# Patient Record
Sex: FEMALE | Race: BLACK OR AFRICAN AMERICAN | NOT HISPANIC OR LATINO | Employment: UNEMPLOYED | URBAN - METROPOLITAN AREA
[De-identification: names, ages, dates, MRNs, and addresses within clinical notes are randomized per-mention and may not be internally consistent; named-entity substitution may affect disease eponyms.]

---

## 2018-02-27 ENCOUNTER — OFFICE VISIT CONVERTED (OUTPATIENT)
Dept: FAMILY MEDICINE CLINIC | Facility: CLINIC | Age: 43
End: 2018-02-27
Attending: NURSE PRACTITIONER

## 2018-03-13 ENCOUNTER — OFFICE VISIT CONVERTED (OUTPATIENT)
Dept: PODIATRY | Facility: CLINIC | Age: 43
End: 2018-03-13
Attending: PODIATRIST

## 2018-04-04 ENCOUNTER — OFFICE VISIT CONVERTED (OUTPATIENT)
Dept: PODIATRY | Facility: CLINIC | Age: 43
End: 2018-04-04
Attending: PODIATRIST

## 2018-06-07 ENCOUNTER — CONVERSION ENCOUNTER (OUTPATIENT)
Dept: FAMILY MEDICINE CLINIC | Facility: CLINIC | Age: 43
End: 2018-06-07

## 2018-06-07 ENCOUNTER — OFFICE VISIT CONVERTED (OUTPATIENT)
Dept: FAMILY MEDICINE CLINIC | Facility: CLINIC | Age: 43
End: 2018-06-07
Attending: NURSE PRACTITIONER

## 2018-06-26 ENCOUNTER — OFFICE VISIT CONVERTED (OUTPATIENT)
Dept: FAMILY MEDICINE CLINIC | Facility: CLINIC | Age: 43
End: 2018-06-26
Attending: NURSE PRACTITIONER

## 2018-07-25 ENCOUNTER — CONVERSION ENCOUNTER (OUTPATIENT)
Dept: FAMILY MEDICINE CLINIC | Facility: CLINIC | Age: 43
End: 2018-07-25

## 2018-08-06 ENCOUNTER — OFFICE VISIT CONVERTED (OUTPATIENT)
Dept: FAMILY MEDICINE CLINIC | Facility: CLINIC | Age: 43
End: 2018-08-06
Attending: NURSE PRACTITIONER

## 2018-08-06 ENCOUNTER — CONVERSION ENCOUNTER (OUTPATIENT)
Dept: FAMILY MEDICINE CLINIC | Facility: CLINIC | Age: 43
End: 2018-08-06

## 2018-11-20 ENCOUNTER — OFFICE VISIT CONVERTED (OUTPATIENT)
Dept: FAMILY MEDICINE CLINIC | Facility: CLINIC | Age: 43
End: 2018-11-20
Attending: NURSE PRACTITIONER

## 2018-12-13 ENCOUNTER — CONVERSION ENCOUNTER (OUTPATIENT)
Dept: FAMILY MEDICINE CLINIC | Facility: CLINIC | Age: 43
End: 2018-12-13

## 2018-12-13 ENCOUNTER — OFFICE VISIT CONVERTED (OUTPATIENT)
Dept: FAMILY MEDICINE CLINIC | Facility: CLINIC | Age: 43
End: 2018-12-13
Attending: NURSE PRACTITIONER

## 2019-03-15 ENCOUNTER — OFFICE VISIT CONVERTED (OUTPATIENT)
Dept: FAMILY MEDICINE CLINIC | Facility: CLINIC | Age: 44
End: 2019-03-15
Attending: NURSE PRACTITIONER

## 2019-03-15 ENCOUNTER — HOSPITAL ENCOUNTER (OUTPATIENT)
Dept: FAMILY MEDICINE CLINIC | Facility: CLINIC | Age: 44
Discharge: HOME | End: 2019-03-15
Attending: NURSE PRACTITIONER

## 2019-03-18 LAB — BACTERIA SPEC AEROBE CULT: NORMAL

## 2019-06-04 ENCOUNTER — CONVERSION ENCOUNTER (OUTPATIENT)
Dept: FAMILY MEDICINE CLINIC | Facility: CLINIC | Age: 44
End: 2019-06-04

## 2019-06-04 ENCOUNTER — OFFICE VISIT CONVERTED (OUTPATIENT)
Dept: FAMILY MEDICINE CLINIC | Facility: CLINIC | Age: 44
End: 2019-06-04
Attending: NURSE PRACTITIONER

## 2019-06-28 ENCOUNTER — HOSPITAL ENCOUNTER (OUTPATIENT)
Dept: URGENT CARE | Facility: CLINIC | Age: 44
Discharge: HOME | End: 2019-06-28

## 2019-07-08 ENCOUNTER — HOSPITAL ENCOUNTER (OUTPATIENT)
Dept: FAMILY MEDICINE CLINIC | Facility: CLINIC | Age: 44
Discharge: HOME | End: 2019-07-08
Attending: NURSE PRACTITIONER

## 2019-07-08 LAB
ALBUMIN SERPL-MCNC: 4.2 G/DL (ref 3.5–5)
ALBUMIN/GLOB SERPL: 1.4 {RATIO} (ref 1.4–2.6)
ALP SERPL-CCNC: 49 U/L (ref 42–98)
ALT SERPL-CCNC: 13 U/L (ref 10–40)
ANION GAP SERPL CALC-SCNC: 18 MMOL/L (ref 8–19)
AST SERPL-CCNC: 12 U/L (ref 15–50)
BASOPHILS # BLD AUTO: 0.04 10*3/UL (ref 0–0.2)
BASOPHILS NFR BLD AUTO: 0.7 % (ref 0–3)
BILIRUB SERPL-MCNC: 0.5 MG/DL (ref 0.2–1.3)
BUN SERPL-MCNC: 11 MG/DL (ref 5–25)
BUN/CREAT SERPL: 12 {RATIO} (ref 6–20)
CALCIUM SERPL-MCNC: 8.8 MG/DL (ref 8.7–10.4)
CHLORIDE SERPL-SCNC: 102 MMOL/L (ref 99–111)
CONV ABS IMM GRAN: 0.02 10*3/UL (ref 0–0.2)
CONV CO2: 21 MMOL/L (ref 22–32)
CONV IMMATURE GRAN: 0.4 % (ref 0–1.8)
CONV TOTAL PROTEIN: 7.1 G/DL (ref 6.3–8.2)
CREAT UR-MCNC: 0.9 MG/DL (ref 0.5–0.9)
DEPRECATED RDW RBC AUTO: 43.8 FL (ref 36.4–46.3)
EOSINOPHIL # BLD AUTO: 0.16 10*3/UL (ref 0–0.7)
EOSINOPHIL # BLD AUTO: 2.8 % (ref 0–7)
ERYTHROCYTE [DISTWIDTH] IN BLOOD BY AUTOMATED COUNT: 13.2 % (ref 11.7–14.4)
EST. AVERAGE GLUCOSE BLD GHB EST-MCNC: 117 MG/DL
GFR SERPLBLD BASED ON 1.73 SQ M-ARVRAT: >60 ML/MIN/{1.73_M2}
GLOBULIN UR ELPH-MCNC: 2.9 G/DL (ref 2–3.5)
GLUCOSE SERPL-MCNC: 94 MG/DL (ref 65–99)
HBA1C MFR BLD: 13.6 G/DL (ref 12–16)
HBA1C MFR BLD: 5.7 % (ref 3.5–5.7)
HCT VFR BLD AUTO: 43.3 % (ref 37–47)
LYMPHOCYTES # BLD AUTO: 2 10*3/UL (ref 1–5)
MCH RBC QN AUTO: 28.5 PG (ref 27–31)
MCHC RBC AUTO-ENTMCNC: 31.4 G/DL (ref 33–37)
MCV RBC AUTO: 90.6 FL (ref 81–99)
MONOCYTES # BLD AUTO: 0.47 10*3/UL (ref 0.2–1.2)
MONOCYTES NFR BLD AUTO: 8.3 % (ref 3–10)
NEUTROPHILS # BLD AUTO: 3 10*3/UL (ref 2–8)
NEUTROPHILS NFR BLD AUTO: 52.7 % (ref 30–85)
NRBC CBCN: 0 % (ref 0–0.7)
OSMOLALITY SERPL CALC.SUM OF ELEC: 283 MOSM/KG (ref 273–304)
PLATELET # BLD AUTO: 186 10*3/UL (ref 130–400)
PMV BLD AUTO: 11.7 FL (ref 9.4–12.3)
POTASSIUM SERPL-SCNC: 4.1 MMOL/L (ref 3.5–5.3)
RBC # BLD AUTO: 4.78 10*6/UL (ref 4.2–5.4)
SODIUM SERPL-SCNC: 137 MMOL/L (ref 135–147)
VARIANT LYMPHS NFR BLD MANUAL: 35.1 % (ref 20–45)
WBC # BLD AUTO: 5.69 10*3/UL (ref 4.8–10.8)

## 2019-07-09 LAB — 25(OH)D3 SERPL-MCNC: 31.8 NG/ML (ref 30–100)

## 2019-07-16 ENCOUNTER — OFFICE VISIT CONVERTED (OUTPATIENT)
Dept: FAMILY MEDICINE CLINIC | Facility: CLINIC | Age: 44
End: 2019-07-16
Attending: NURSE PRACTITIONER

## 2019-09-23 ENCOUNTER — OFFICE VISIT CONVERTED (OUTPATIENT)
Dept: FAMILY MEDICINE CLINIC | Facility: CLINIC | Age: 44
End: 2019-09-23
Attending: NURSE PRACTITIONER

## 2019-09-23 ENCOUNTER — CONVERSION ENCOUNTER (OUTPATIENT)
Dept: FAMILY MEDICINE CLINIC | Facility: CLINIC | Age: 44
End: 2019-09-23

## 2019-11-02 ENCOUNTER — HOSPITAL ENCOUNTER (OUTPATIENT)
Dept: URGENT CARE | Facility: CLINIC | Age: 44
Discharge: HOME | End: 2019-11-02

## 2020-04-22 ENCOUNTER — TELEPHONE CONVERTED (OUTPATIENT)
Dept: FAMILY MEDICINE CLINIC | Facility: CLINIC | Age: 45
End: 2020-04-22
Attending: NURSE PRACTITIONER

## 2020-05-19 ENCOUNTER — OFFICE VISIT CONVERTED (OUTPATIENT)
Dept: FAMILY MEDICINE CLINIC | Facility: CLINIC | Age: 45
End: 2020-05-19
Attending: NURSE PRACTITIONER

## 2020-05-19 ENCOUNTER — HOSPITAL ENCOUNTER (OUTPATIENT)
Dept: FAMILY MEDICINE CLINIC | Facility: CLINIC | Age: 45
Discharge: HOME | End: 2020-05-19
Attending: NURSE PRACTITIONER

## 2020-05-19 LAB
C TRACH RRNA CVX QL NAA+PROBE: NOT DETECTED
N GONORRHOEA DNA SPEC QL NAA+PROBE: NOT DETECTED

## 2020-07-15 ENCOUNTER — HOSPITAL ENCOUNTER (OUTPATIENT)
Dept: URGENT CARE | Facility: CLINIC | Age: 45
Discharge: HOME | End: 2020-07-15
Attending: FAMILY MEDICINE

## 2020-07-17 ENCOUNTER — CONVERSION ENCOUNTER (OUTPATIENT)
Dept: FAMILY MEDICINE CLINIC | Facility: CLINIC | Age: 45
End: 2020-07-17

## 2020-07-17 ENCOUNTER — OFFICE VISIT CONVERTED (OUTPATIENT)
Dept: FAMILY MEDICINE CLINIC | Facility: CLINIC | Age: 45
End: 2020-07-17
Attending: NURSE PRACTITIONER

## 2020-07-17 ENCOUNTER — HOSPITAL ENCOUNTER (OUTPATIENT)
Dept: FAMILY MEDICINE CLINIC | Facility: CLINIC | Age: 45
Discharge: HOME | End: 2020-07-17
Attending: NURSE PRACTITIONER

## 2020-07-17 LAB
ALBUMIN SERPL-MCNC: 4.5 G/DL (ref 3.5–5)
ALBUMIN/GLOB SERPL: 1.6 {RATIO} (ref 1.4–2.6)
ALP SERPL-CCNC: 55 U/L (ref 42–98)
ALT SERPL-CCNC: 15 U/L (ref 10–40)
ANION GAP SERPL CALC-SCNC: 17 MMOL/L (ref 8–19)
APPEARANCE UR: CLEAR
AST SERPL-CCNC: 14 U/L (ref 15–50)
BASOPHILS # BLD AUTO: 0.03 10*3/UL (ref 0–0.2)
BASOPHILS NFR BLD AUTO: 0.5 % (ref 0–3)
BILIRUB SERPL-MCNC: 0.87 MG/DL (ref 0.2–1.3)
BILIRUB UR QL: NEGATIVE
BUN SERPL-MCNC: 9 MG/DL (ref 5–25)
BUN/CREAT SERPL: 11 {RATIO} (ref 6–20)
CALCIUM SERPL-MCNC: 9.6 MG/DL (ref 8.7–10.4)
CHLORIDE SERPL-SCNC: 102 MMOL/L (ref 99–111)
CHOLEST SERPL-MCNC: 188 MG/DL (ref 107–200)
CHOLEST/HDLC SERPL: 4.6 {RATIO} (ref 3–6)
COLOR UR: YELLOW
CONV ABS IMM GRAN: 0.02 10*3/UL (ref 0–0.2)
CONV CO2: 23 MMOL/L (ref 22–32)
CONV COLLECTION SOURCE (UA): NORMAL
CONV IMMATURE GRAN: 0.3 % (ref 0–1.8)
CONV TOTAL PROTEIN: 7.3 G/DL (ref 6.3–8.2)
CONV UROBILINOGEN IN URINE BY AUTOMATED TEST STRIP: 0.2 {EHRLICHU}/DL (ref 0.1–1)
CREAT UR-MCNC: 0.81 MG/DL (ref 0.5–0.9)
DEPRECATED RDW RBC AUTO: 44 FL (ref 36.4–46.3)
EOSINOPHIL # BLD AUTO: 0.14 10*3/UL (ref 0–0.7)
EOSINOPHIL # BLD AUTO: 2.3 % (ref 0–7)
ERYTHROCYTE [DISTWIDTH] IN BLOOD BY AUTOMATED COUNT: 13.7 % (ref 11.7–14.4)
GFR SERPLBLD BASED ON 1.73 SQ M-ARVRAT: >60 ML/MIN/{1.73_M2}
GLOBULIN UR ELPH-MCNC: 2.8 G/DL (ref 2–3.5)
GLUCOSE SERPL-MCNC: 126 MG/DL (ref 65–99)
GLUCOSE UR QL: NEGATIVE MG/DL
HCT VFR BLD AUTO: 44 % (ref 37–47)
HDLC SERPL-MCNC: 41 MG/DL (ref 40–60)
HGB BLD-MCNC: 13.8 G/DL (ref 12–16)
HGB UR QL STRIP: NEGATIVE
KETONES UR QL STRIP: NEGATIVE MG/DL
LDLC SERPL CALC-MCNC: 127 MG/DL (ref 70–100)
LEUKOCYTE ESTERASE UR QL STRIP: NEGATIVE
LYMPHOCYTES # BLD AUTO: 2.24 10*3/UL (ref 1–5)
LYMPHOCYTES NFR BLD AUTO: 36.1 % (ref 20–45)
MCH RBC QN AUTO: 27.6 PG (ref 27–31)
MCHC RBC AUTO-ENTMCNC: 31.4 G/DL (ref 33–37)
MCV RBC AUTO: 88 FL (ref 81–99)
MONOCYTES # BLD AUTO: 0.4 10*3/UL (ref 0.2–1.2)
MONOCYTES NFR BLD AUTO: 6.4 % (ref 3–10)
NEUTROPHILS # BLD AUTO: 3.38 10*3/UL (ref 2–8)
NEUTROPHILS NFR BLD AUTO: 54.4 % (ref 30–85)
NITRITE UR QL STRIP: NEGATIVE
NRBC CBCN: 0 % (ref 0–0.7)
OSMOLALITY SERPL CALC.SUM OF ELEC: 286 MOSM/KG (ref 273–304)
PH UR STRIP.AUTO: 7.5 [PH] (ref 5–8)
PLATELET # BLD AUTO: 255 10*3/UL (ref 130–400)
PMV BLD AUTO: 10.9 FL (ref 9.4–12.3)
POTASSIUM SERPL-SCNC: 3.9 MMOL/L (ref 3.5–5.3)
PROT UR QL: NEGATIVE MG/DL
RBC # BLD AUTO: 5 10*6/UL (ref 4.2–5.4)
SODIUM SERPL-SCNC: 138 MMOL/L (ref 135–147)
SP GR UR: 1.01 (ref 1–1.03)
T4 FREE SERPL-MCNC: 1.2 NG/DL (ref 0.9–1.8)
TRIGL SERPL-MCNC: 98 MG/DL (ref 40–150)
TSH SERPL-ACNC: 1.38 M[IU]/L (ref 0.27–4.2)
VLDLC SERPL-MCNC: 20 MG/DL (ref 5–37)
WBC # BLD AUTO: 6.21 10*3/UL (ref 4.8–10.8)

## 2020-07-20 LAB
EST. AVERAGE GLUCOSE BLD GHB EST-MCNC: 128 MG/DL
HBA1C MFR BLD: 6.1 % (ref 3.5–5.7)

## 2020-10-28 ENCOUNTER — OFFICE VISIT CONVERTED (OUTPATIENT)
Dept: FAMILY MEDICINE CLINIC | Facility: CLINIC | Age: 45
End: 2020-10-28
Attending: NURSE PRACTITIONER

## 2021-02-03 ENCOUNTER — OFFICE VISIT CONVERTED (OUTPATIENT)
Dept: FAMILY MEDICINE CLINIC | Facility: CLINIC | Age: 46
End: 2021-02-03
Attending: NURSE PRACTITIONER

## 2021-02-03 ENCOUNTER — HOSPITAL ENCOUNTER (OUTPATIENT)
Dept: FAMILY MEDICINE CLINIC | Facility: CLINIC | Age: 46
Discharge: HOME | End: 2021-02-03
Attending: NURSE PRACTITIONER

## 2021-02-03 LAB
BASOPHILS # BLD AUTO: 0.04 10*3/UL (ref 0–0.2)
BASOPHILS NFR BLD AUTO: 0.6 % (ref 0–3)
CONV ABS IMM GRAN: 0.01 10*3/UL (ref 0–0.2)
CONV IMMATURE GRAN: 0.2 % (ref 0–1.8)
DEPRECATED RDW RBC AUTO: 42.5 FL (ref 36.4–46.3)
EOSINOPHIL # BLD AUTO: 0.2 10*3/UL (ref 0–0.7)
EOSINOPHIL # BLD AUTO: 3.1 % (ref 0–7)
ERYTHROCYTE [DISTWIDTH] IN BLOOD BY AUTOMATED COUNT: 13.4 % (ref 11.7–14.4)
EST. AVERAGE GLUCOSE BLD GHB EST-MCNC: 126 MG/DL
HBA1C MFR BLD: 6 % (ref 3.5–5.7)
HCT VFR BLD AUTO: 44.1 % (ref 37–47)
HGB BLD-MCNC: 13.9 G/DL (ref 12–16)
LYMPHOCYTES # BLD AUTO: 2.86 10*3/UL (ref 1–5)
LYMPHOCYTES NFR BLD AUTO: 44.2 % (ref 20–45)
MCH RBC QN AUTO: 27.4 PG (ref 27–31)
MCHC RBC AUTO-ENTMCNC: 31.5 G/DL (ref 33–37)
MCV RBC AUTO: 87 FL (ref 81–99)
MONOCYTES # BLD AUTO: 0.54 10*3/UL (ref 0.2–1.2)
MONOCYTES NFR BLD AUTO: 8.3 % (ref 3–10)
NEUTROPHILS # BLD AUTO: 2.82 10*3/UL (ref 2–8)
NEUTROPHILS NFR BLD AUTO: 43.6 % (ref 30–85)
NRBC CBCN: 0 % (ref 0–0.7)
PLATELET # BLD AUTO: 248 10*3/UL (ref 130–400)
PMV BLD AUTO: 11 FL (ref 9.4–12.3)
RBC # BLD AUTO: 5.07 10*6/UL (ref 4.2–5.4)
WBC # BLD AUTO: 6.47 10*3/UL (ref 4.8–10.8)

## 2021-02-04 LAB
25(OH)D3 SERPL-MCNC: 32.1 NG/ML (ref 30–100)
ALBUMIN SERPL-MCNC: 4.5 G/DL (ref 3.5–5)
ALBUMIN/GLOB SERPL: 1.4 {RATIO} (ref 1.4–2.6)
ALP SERPL-CCNC: 58 U/L (ref 42–98)
ALT SERPL-CCNC: 14 U/L (ref 10–40)
ANION GAP SERPL CALC-SCNC: 11 MMOL/L (ref 8–19)
AST SERPL-CCNC: 17 U/L (ref 15–50)
BILIRUB SERPL-MCNC: 0.69 MG/DL (ref 0.2–1.3)
BUN SERPL-MCNC: 6 MG/DL (ref 5–25)
BUN/CREAT SERPL: 8 {RATIO} (ref 6–20)
CALCIUM SERPL-MCNC: 9.9 MG/DL (ref 8.7–10.4)
CHLORIDE SERPL-SCNC: 101 MMOL/L (ref 99–111)
CHOLEST SERPL-MCNC: 171 MG/DL (ref 107–200)
CHOLEST/HDLC SERPL: 3.4 {RATIO} (ref 3–6)
CONV CO2: 27 MMOL/L (ref 22–32)
CONV TOTAL PROTEIN: 7.7 G/DL (ref 6.3–8.2)
CREAT UR-MCNC: 0.78 MG/DL (ref 0.5–0.9)
FERRITIN SERPL-MCNC: 107 NG/ML (ref 10–200)
FOLATE SERPL-MCNC: 10.6 NG/ML (ref 4.8–20)
GFR SERPLBLD BASED ON 1.73 SQ M-ARVRAT: >60 ML/MIN/{1.73_M2}
GLOBULIN UR ELPH-MCNC: 3.2 G/DL (ref 2–3.5)
GLUCOSE SERPL-MCNC: 87 MG/DL (ref 65–99)
HDLC SERPL-MCNC: 50 MG/DL (ref 40–60)
IRON SATN MFR SERPL: 18 % (ref 20–55)
IRON SERPL-MCNC: 75 UG/DL (ref 60–170)
LDLC SERPL CALC-MCNC: 90 MG/DL (ref 70–100)
OSMOLALITY SERPL CALC.SUM OF ELEC: 277 MOSM/KG (ref 273–304)
POTASSIUM SERPL-SCNC: 4.1 MMOL/L (ref 3.5–5.3)
SODIUM SERPL-SCNC: 135 MMOL/L (ref 135–147)
T4 FREE SERPL-MCNC: 1 NG/DL (ref 0.9–1.8)
TIBC SERPL-MCNC: 422 UG/DL (ref 245–450)
TRANSFERRIN SERPL-MCNC: 295 MG/DL (ref 250–380)
TRIGL SERPL-MCNC: 156 MG/DL (ref 40–150)
TSH SERPL-ACNC: 1.98 M[IU]/L (ref 0.27–4.2)
VIT B12 SERPL-MCNC: 878 PG/ML (ref 211–911)
VLDLC SERPL-MCNC: 31 MG/DL (ref 5–37)

## 2021-05-12 NOTE — PROGRESS NOTES
Quick Note      Patient Name: Sowmya Mac   Patient ID: [id number]   Sex: Female   YOB: 1975    Primary Care Provider: Dana KIRK   Referring Provider: Dana KIRK    Visit Date: April 22, 2020    Provider: YELENA Sales   Location: [facility]   Location Address: [address] [place] [address], [address] [place] [address]  [id number]   Location Phone: [phone]          History Of Present Illness  TELEHEALTH TELEPHONE VISIT  Chief Complaint: follow up/med refills   Sowmya Mac is a 44 year old /Black female who is presenting for evaluation via telehealth telephone visit. Verbal consent obtained before beginning visit.   Provider spent 29 minutes with the patient during telehealth visit.   The following staff were present during this visit: YELENA Camarena   Past Medical History/Overview of Patient Symptoms     Follow-up and to establish care.  She is a previous patient of YELENA Whipple.    Hypertension: She is currently stable on amlodipine 10 mg, clonidine 0.1 mg twice daily, and Toprol XL 50 mg daily.  She does not have a blood pressure monitor at home but states she is feeling much better than she did when she used to have higher blood pressures.  Denies any headache, dizziness, chest pain.    History of vitamin D deficiency: She is currently taking over-the-counter vitamin D3 but needs a refill.    History of seasonal allergies: She only takes fexofenadine as needed.    History of leg cramps: She only takes magnesium as needed.  She denies having any leg cramps at this time, states it is worse in the summertime when it is real hot.    She is complaining that her hands are dry and cracking from the frequent hand washing.    She is complaining of hair thinning, noticing more hair falling out if she brushes her hair.    History of smoking: She currently smokes about half a pack per day.  She does states she is not smoking as much now since  she is laid off for the past 2 weeks.  She is never tried to quit smoking but is interested in quitting.           Assessment  · Essential hypertension     401.9/I10  · Cigarette nicotine dependence without complication     305.1/F17.210  · Vitamin D deficiency     268.9/E55.9  · Allergies     461.8      Plan  · Orders  o Physician Telephone Evaluation, 21-30 minutes (74223) - 401.9/I10, 268.9/E55.9, 461.8, 305.1/F17.210 - 04/22/2020  · Medications  o clonidine HCl 0.1 mg oral tablet   SIG: take 1 tablet (0.1 mg) by oral route 2 times per day for 30 days   DISP: (180) Tablet with 1 refills  Adjusted on 04/22/2020     o Toprol XL 50 mg oral tablet extended release 24 hr   SIG: take 1 tablet (50 mg) by oral route once daily for 90 days   DISP: (90) tablets with 1 refills  Adjusted on 04/22/2020     o Vitamin D3 50 mcg (2,000 unit) oral capsule   SIG: TAKE 1 CAPSULE BY ORAL ROUTE DAILY FOR 30 DAYS for 90 days   DISP: (90) Capsule with 1 refills  Adjusted on 04/22/2020     o Medications have been Reconciled  o Transition of Care or Provider Policy  · Instructions  o Patient advised to limit sodium intake.  o *Form of nicotine being used: Cigarettes  o Patient was strongly encouraged to discontinue use of any nicotine containing product or minimize the use of the product.  o Discussed smoking cessation and counseling with patient for over 3 minutes.  o Plan Of Care:   o Patient instructed to seek medical attention urgently for new or worsening symptoms.  o Patient was educated/instructed on their diagnosis, treatment and medications.  o Patient is taking medications as prescribed and doing well.   o Patient instructed/educated on their diet and exercise program.  o Patient counseled to stop smoking.  o Patient was instructed to exercise regularly.  o Call the office with any concerns or questions.  o Discussed Covid-19 precautions including, but not limited to, social distancing, avoid touching your face, and hand  washing.   · Disposition  o Return to clinic in 3 months     For dry cracked hands, patient was instructed to get some Vaseline and apply to hands and cover with cotton gloves overnight.    Discussed hair loss, recommended to get a good multivitamin or 1 for hair and nails.  We will discuss further on her follow-up in 3 months.             Electronically Signed by: YELENA Sales -Author on April 22, 2020 08:45:00 AM

## 2021-05-13 NOTE — PROGRESS NOTES
Progress Note      Patient Name: Sowmya Mac   Patient ID: [id number]   Sex: Female   YOB: 1975    Primary Care Provider: Pauline KIRK   Referring Provider: Pauline KIRK    Visit Date: October 28, 2020    Provider: YELENA Sales   Location: [facility]   Location Address: [address] [place] [address], [address] [place] [address]  [id number]   Location Phone: [phone]          Chief Complaint  · right side of chest hurting, uncomfortable when taking deep breaths      History Of Present Illness  Sowmya Mac is a 45 year old /Black female who presents for evaluation and treatment of:      She is here for an acute visit today complaining of right side of her chest hurting, uncomfortable when taking deep breaths or with certain movements.  She denies any shortness of breath, fever, difficulty breathing or cough.  She denies any known injuries.  She states she did take 2 Aleve yesterday which did help her feel better.       Past Medical History  Disease Name Date Onset Notes   Allergies --  --    Anemia --  --    Anxiety --  --    Corns and callus --  --    Decubitus ulcer of foot, stage 1 04/04/2018 --    Eczema --  --    Essential hypertension 04/22/2020 --    Foot pain, right 04/04/2018 --    Gall stones --  --    Heat intolerance 07/17/2020 --    Hernia --  --    High blood pressure --  --    Night sweat --  --    Sinus trouble --  --    Vitamin D deficiency 04/22/2020 --          Past Surgical History  Procedure Name Date Notes   Cholecstectomy --  --    Hysterectomy 2013 partial         Medication List  Name Date Started Instructions   amlodipine 10 mg oral tablet 10/22/2020 take 1 tablet (10 mg) by oral route once daily for 90 days   clonidine HCl 0.1 mg oral tablet 10/22/2020 take 1 tablet (0.1 mg) by oral route 2 times per day for 30 days   magnesium 250 mg oral tablet 12/05/2019 take 1 tablet by oral route daily for 90  "days   Toprol XL 50 mg oral tablet extended release 24 hr 10/22/2020 take 1 tablet (50 mg) by oral route once daily for 90 days   Vitamin D3 50 mcg (2,000 unit) oral capsule 10/22/2020 TAKE 1 CAPSULE BY ORAL ROUTE DAILY FOR 30 DAYS for 90 days         Allergy List  Allergen Name Date Reaction Notes   NO KNOWN DRUG ALLERGIES --  --  --        Allergies Reconciled  Family Medical History  Disease Name Relative/Age Notes   Heart Disease Father/  Mother/   --    Diabetes Father/   --          Social History  Finding Status Start/Stop Quantity Notes   Alcohol Current some day --/-- --  Occasionlly   Tobacco Current every day 16/-- 1/2 Pack QD --          Immunizations  NameDate Admin Mfg Trade Name Lot Number Route Inj VIS Given VIS Publication   InfluenzaRefused 10/28/2020 NE Not Entered  NE NE     Comments:          Review of Systems  · Constitutional  o Denies  o : fever, fatigue, weight loss, weight gain  · HENT  o Admits  o : nasal discharge  o Denies  o : sinus pain, nasal congestion, sore throat  · Cardiovascular  o Admits  o : chest pain (non-cardiac)  o Denies  o : irregular heart beats, lower extremity edema  · Respiratory  o Denies  o : shortness of breath, wheezing, cough, productive cough  · Gastrointestinal  o Denies  o : nausea, vomiting, diarrhea, constipation, abdominal pain      Vitals  Date Time BP Position Site L\R Cuff Size HR RR TEMP (F) WT  HT  BMI kg/m2 BSA m2 O2 Sat FR L/min FiO2 HC       10/28/2020 09:20 /72 Sitting    79 - R  97.8 222lbs 0oz 5'  9\" 32.78 2.21 99 %            Physical Examination  · Constitutional  o Appearance  o : no acute distress, well-nourished  · Head and Face  o Head  o :   § Inspection  § : atraumatic, normocephalic  · Neck  o Thyroid  o : gland size normal, nontender, no nodules or masses present on palpation, symmetric  · Respiratory  o Respiratory Effort  o : breathing comfortably, symmetric chest rise  o Auscultation of Lungs  o : clear to asculatation " bilaterally, no wheezes, rales, or rhonchii  · Cardiovascular  o Heart  o :   § Auscultation of Heart  § : regular rate and rhythm, no murmurs, rubs, or gallops  o Peripheral Vascular System  o :   § Extremities  § : no edema  · Lymphatic  o Neck  o : no lymphadenopathy present  · Musculoskeletal  o Trunk/Spine  o : Chest wall nontender on palpation  · Neurologic  o Mental Status Examination  o :   § Orientation  § : grossly oriented to person, place and time  o Gait and Station  o :   § Gait Screening  § : normal gait  · Psychiatric  o General  o : normal mood and affect          Assessment  · Chest wall pain     786.52/R07.89  We discussed that her chest pain is noncardiac and most likely muscular skeletal related. I recommended that she take some over-the-counter Aleve or Motrin with food as needed. We will also start her on cyclobenzaprine 5 mg nightly as needed. Advised her to follow-up in 2 to 3 weeks if not improving.  · Muscle spasm     728.85/M62.838      Plan  · Orders  o ACO-39: Current medications updated and reviewed (1159F, ) - - 10/28/2020  o ACO-14: Influenza immunization was not administered for reasons documented [facility] () - - 10/28/2020   patient declines  · Medications  o cyclobenzaprine 5 mg oral tablet   SIG: take 1 tablet (5 mg) by oral route once daily as needed for pain   DISP: (30) Tablet with 0 refills  Prescribed on 10/28/2020     o Medications have been Reconciled  o Transition of Care or Provider Policy  · Instructions  o Patient was educated/instructed on their diagnosis, treatment and medications prior to discharge from the clinic today.  o Patient instructed to seek medical attention urgently for new or worsening symptoms.  o Call the office with any concerns or questions.  o Discussed Covid-19 precautions including, but not limited to, social distancing, avoid touching your face, and hand washing.   · Disposition  o Call or Return if symptoms worsen or persist.  o Return to  clinic in 3 months            Electronically Signed by: YELENA Sales -Author on October 28, 2020 11:06:52 AM

## 2021-05-13 NOTE — PROGRESS NOTES
Progress Note      Patient Name: Sowmya Mac   Patient ID: [id number]   Sex: Female   YOB: 1975    Primary Care Provider: Pauline KIRK   Referring Provider: Pauline KIRK    Visit Date: July 17, 2020    Provider: YELENA Sales   Location: [facility]   Location Address: [address] [place] [address], [address]  [id number]   Location Phone: [phone]          Chief Complaint  · follow up  · problems with overheating at work      History Of Present Illness  Sowmya Mac is a 44 year old /Black female who presents for evaluation and treatment of:      She is complaining of overheating at work.  She states that the air conditioners have been broke and they have portable air conditioners that are not sufficient.  She is complaining with wearing the mask that this making her very hot and she sweats profusely.  She states that she drinks water all day but she states her urine is dark because she is not urinating very much because she is sweating so much.  She states they are wanting them to work 6 days straight in a row and she states this is too much for her.  She is wanting a note to state that she can work 3 days and be off 1 day in between.  She has lost some weight since her last visit.    History of hypertension: Blood pressure is stable at 110/70 on amlodipine 10 mg, Toprol XL 50 mg daily, and clonidine 0.1 mg twice daily.    She also has some pending labs that we will do today.       Past Medical History  Disease Name Date Onset Notes   Allergies --  --    Anemia --  --    Anxiety --  --    Corns and callus --  --    Decubitus ulcer of foot, stage 1 04/04/2018 --    Eczema --  --    Essential hypertension 04/22/2020 --    Foot pain, right 04/04/2018 --    Gall stones --  --    Heat intolerance 07/17/2020 --    Hernia --  --    High blood pressure --  --    Night sweat --  --    Sinus trouble --  --    Vitamin D deficiency  04/22/2020 --          Past Surgical History  Procedure Name Date Notes   Cholecstectomy --  --    Hysterectomy 2013 partial         Medication List  Name Date Started Instructions   amlodipine 10 mg oral tablet 04/21/2020 take 1 tablet (10 mg) by oral route once daily for 90 days   clonidine HCl 0.1 mg oral tablet 04/22/2020 take 1 tablet (0.1 mg) by oral route 2 times per day for 30 days   fexofenadine 180 mg oral tablet 07/16/2019 take 1 tablet (180 mg) by oral route once daily for 30 days   magnesium 250 mg oral tablet 12/05/2019 take 1 tablet by oral route daily for 90 days   Toprol XL 50 mg oral tablet extended release 24 hr 04/22/2020 take 1 tablet (50 mg) by oral route once daily for 90 days   Vitamin D3 50 mcg (2,000 unit) oral capsule 04/22/2020 TAKE 1 CAPSULE BY ORAL ROUTE DAILY FOR 30 DAYS for 90 days         Allergy List  Allergen Name Date Reaction Notes   NO KNOWN DRUG ALLERGIES --  --  --          Family Medical History  Disease Name Relative/Age Notes   Heart Disease Father/  Mother/   --    Diabetes Father/   --          Social History  Finding Status Start/Stop Quantity Notes   Alcohol Current some day --/-- --  Occasionlly   Tobacco Current every day 16/-- 1/2 Pack QD --          Immunizations  NameDate Admin Mfg Trade Name Lot Number Route Inj VIS Given VIS Publication   InfluenzaRefused 05/19/2020 NE Not Entered  NE NE     Comments:          Review of Systems  · Constitutional  o Denies  o : fatigue, fever, chills, body aches, night sweats  · Cardiovascular  o Denies  o : lower extremity edema, claudication, chest pressure, palpitations  · Respiratory  o Denies  o : shortness of breath, wheezing, cough, hemoptysis, dyspnea on exertion  · Gastrointestinal  o Denies  o : nausea, vomiting, diarrhea, constipation, abdominal pain  · Integument  o Denies  o : rash, itching  · Endocrine  o Admits  o : heat intolerance, weight loss  o Denies  o : weight gain  · Psychiatric  o Denies  o : anxiety,  "depression, suicidal ideation, homicidal ideation  · Allergic-Immunologic  o Denies  o : sinus allergy symptoms, frequent illnesses      Vitals  Date Time BP Position Site L\R Cuff Size HR RR TEMP (F) WT  HT  BMI kg/m2 BSA m2 O2 Sat        07/17/2020 10:12 /70 Sitting    100 - R  97.6 224lbs 8oz 5'  9\" 33.15 2.23 100 %          Physical Examination  · Constitutional  o Appearance  o : no acute distress, well-nourished  · Head and Face  o Head  o :   § Inspection  § : atraumatic, normocephalic  · Neck  o Thyroid  o : gland size normal, nontender, no nodules or masses present on palpation, symmetric  · Respiratory  o Respiratory Effort  o : breathing comfortably, symmetric chest rise  o Auscultation of Lungs  o : clear to asculatation bilaterally, no wheezes, rales, or rhonchii  · Cardiovascular  o Heart  o :   § Auscultation of Heart  § : regular rate and rhythm, no murmurs, rubs, or gallops  o Peripheral Vascular System  o :   § Extremities  § : no edema  · Lymphatic  o Neck  o : no lymphadenopathy present  · Neurologic  o Mental Status Examination  o :   § Orientation  § : grossly oriented to person, place and time  o Gait and Station  o :   § Gait Screening  § : normal gait  · Psychiatric  o General  o : normal mood and affect          Results  · In-Office Procedures  o Lab procedure  § IOP - Urinalysis without Microscopy (Clinitek) [facility] (48389)   § Color Ur: Yellow   § Clarity Ur: Clear   § Glucose Ur Ql Strip: Negative   § Bilirub Ur Ql Strip: Negative   § Ketones Ur Ql Strip: Negative   § Sp Gr Ur Qn: 1.015   § Hgb Ur Ql Strip: Trace-Intact   § pH Ur-LsCnc: 7.0   § Prot Ur Ql Strip: Negative   § Urobilinogen Ur Strip-mCnc: 0.2 E.U./dL   § Nitrite Ur Ql Strip: Negative   § WBC Est Ur Ql Strip: Negative       Assessment  · Visit for screening mammogram     V76.12/Z12.31  · Essential hypertension     401.9/I10  · Hematuria     599.70/R31.9  · Heat intolerance     780.99/R68.89    Problems " Reconciled  Plan  · Orders  o Screening Mammography; Bilateral 3D (16455, 03399, ) - V76.12/Z12.31 - 07/17/2020  o Urinalysis with Reflex Microscopy if abnormal ([facility]) (04528) - 599.70/R31.9 - 07/17/2020  o ACO-39: Current medications updated and reviewed () - - 07/17/2020  · Medications  o Medications have been Reconciled  o Transition of Care or Provider Policy  · Instructions  o Patient advised to monitor blood pressure (B/P) at home and journal readings. Patient informed that a B/P reading at home of more than 130/80 is considered hypertension. For readings greater mdcl975/90 or higher patient is advised to follow up in the office with readings for management. Patient advised to limit sodium intake.  o Patient was educated/instructed on their diagnosis, treatment and medications prior to discharge from the clinic today.  o Patient instructed to seek medical attention urgently for new or worsening symptoms.  o Call the office with any concerns or questions.  o Discussed Covid-19 precautions including, but not limited to, social distancing, avoid touching your face, and hand washing.   · Disposition  o Return to clinic in 6 months     UA in office today showed trace of blood, otherwise negative, will send for microscopy.    Note given to patient today to adjust her work schedule to work 3 and be off a day in between to allow her to rest.             Electronically Signed by: Pauline Coates APRN -Author on July 17, 2020 12:46:02 PM

## 2021-05-13 NOTE — PROGRESS NOTES
Progress Note      Patient Name: Sowmya Mac   Patient ID: [id number]   Sex: Female   YOB: 1975    Primary Care Provider: Pauline KIRK    Visit Date: May 19, 2020    Provider: YELENA Sales   Location: [facility]   Location Address: [address] [place] [address], [address]  [id number]   Location Phone: [phone]          Chief Complaint  · Vaginal odor      History Of Present Illness  Sowmya Mac is a 44 year old /Black female who presents for evaluation and treatment of:      For an acute visit today.  She is complaining of vaginal odor.  States she had sexual intercourse with her ex last week on Thursday and then on Sunday has developed a vaginal odor with clear discharge.     History of hypertension: Blood pressure stable at 138/80 on amlodipine 10 mg, clonidine 0.1 mg twice daily and Toprol XL 50 mg daily.       Past Medical History  Allergies; Anemia; Anxiety; Corns and callus; Decubitus ulcer of foot, stage 1; Eczema; Essential hypertension; Foot pain, right; Gall stones; Hernia; High blood pressure; Night sweat; Sinus trouble; Vitamin D deficiency         Past Surgical History  Cholecstectomy; Hysterectomy         Medication List  amlodipine 10 mg oral tablet; clonidine HCl 0.1 mg oral tablet; fexofenadine 180 mg oral tablet; magnesium 250 mg oral tablet; Toprol XL 50 mg oral tablet extended release 24 hr; Vitamin D3 50 mcg (2,000 unit) oral capsule         Allergy List  NO KNOWN DRUG ALLERGIES       Allergies Reconciled  Family Medical History  Heart Disease; Diabetes         Social History  Alcohol (Current some day); Tobacco (Current every day)         Immunizations  Name Date Admin   Influenza Refused         Review of Systems  · Constitutional  o Denies  o : fever, fatigue, weight loss, weight gain  · Cardiovascular  o Denies  o : lower extremity edema, claudication, chest pressure, palpitations  · Respiratory  o Denies  o :  "shortness of breath, wheezing, cough, hemoptysis, dyspnea on exertion  · Gastrointestinal  o Denies  o : nausea, vomiting, diarrhea, constipation, abdominal pain  · Genitourinary  o Admits  o : vaginal discharge  o Denies  o : urgency, frequency, dysuria, dyspareunia, genital sores  · Integument  o Denies  o : rash, itching      Vitals  Date Time BP Position Site L\R Cuff Size HR RR TEMP (F) WT  HT  BMI kg/m2 BSA m2 O2 Sat        05/19/2020 09:40 /80 Sitting    92 - R  99 231lbs 2oz 5'  9\" 34.13 2.26 99 %          Physical Examination  · Constitutional  o Appearance  o : no acute distress, well-nourished  · Head and Face  o Head  o :   § Inspection  § : atraumatic, normocephalic  · Respiratory  o Respiratory Effort  o : breathing comfortably, symmetric chest rise  o Auscultation of Lungs  o : clear to asculatation bilaterally, no wheezes, rales, or rhonchii  · Cardiovascular  o Heart  o :   § Auscultation of Heart  § : regular rate and rhythm, no murmurs, rubs, or gallops  o Peripheral Vascular System  o :   § Extremities  § : no edema  · Neurologic  o Mental Status Examination  o :   § Orientation  § : grossly oriented to person, place and time  o Gait and Station  o :   § Gait Screening  § : normal gait  · Psychiatric  o General  o : normal mood and affect          Results  · In-Office Procedures  o Lab procedure  § IOP - Urinalysis without Microscopy (Clinitek) [facility] (91034)   § Color Ur: Yellow   § Clarity Ur: Clear   § Glucose Ur Ql Strip: Negative   § Bilirub Ur Ql Strip: Negative   § Ketones Ur Ql Strip: Negative   § Sp Gr Ur Qn: 1.020   § Hgb Ur Ql Strip: Trace-Lysed   § pH Ur-LsCnc: 7.0   § Prot Ur Ql Strip: Negative   § Urobilinogen Ur Strip-mCnc: 0.2 E.U./dL   § Nitrite Ur Ql Strip: Negative   § WBC Est Ur Ql Strip: Negative       Assessment  · Essential hypertension     401.9/I10  · STD exposure     V01.6/Z20.2  · Vaginal odor     625.8/N89.8    Problems Reconciled  Plan  · Orders  o GC/Chlamydia " by PCR (Urine or Vaginal Swab) [facility] ([facility]) - V01.6/Z20.2 - 05/19/2020  o ACO-39: Current medications updated and reviewed () - - 05/19/2020  o ACO-14: Influenza immunization was not administered for reasons documented () - - 05/19/2020   Declined  o Vaginal Screen (Candida, Gardnerella & Trichomonas) (70617) - V01.6/Z20.2, 625.8/N89.8 - 05/19/2020  · Medications  o Flagyl 500 mg oral tablet   SIG: take 1 tablet (500 mg) by oral route every 12 hours for 7 days   DISP: (14) tablets with 0 refills  Prescribed on 05/19/2020     o Medications have been Reconciled  o Transition of Care or Provider Policy  · Instructions  o Patient was educated/instructed on their diagnosis, treatment and medications prior to discharge from the clinic today.  o Patient instructed to seek medical attention urgently for new or worsening symptoms.  o Call the office with any concerns or questions.  · Disposition  o Call or Return if symptoms worsen or persist.     Discussed signs and symptoms of bacterial vaginosis, will go ahead and treat her with Flagyl 500 mg twice daily x7 days.  Advised not to drink any alcohol while taking this medication.  We will collect vaginal swabs today, will call results and treatment plan.             Electronically Signed by: YELENA Sales -Author on May 19, 2020 10:15:46 AM

## 2021-05-14 VITALS
WEIGHT: 222 LBS | OXYGEN SATURATION: 99 % | SYSTOLIC BLOOD PRESSURE: 122 MMHG | BODY MASS INDEX: 32.88 KG/M2 | DIASTOLIC BLOOD PRESSURE: 72 MMHG | HEART RATE: 79 BPM | TEMPERATURE: 97.8 F | HEIGHT: 69 IN

## 2021-05-14 VITALS
WEIGHT: 226.25 LBS | HEIGHT: 69 IN | TEMPERATURE: 98.2 F | OXYGEN SATURATION: 97 % | HEART RATE: 89 BPM | BODY MASS INDEX: 33.51 KG/M2 | SYSTOLIC BLOOD PRESSURE: 120 MMHG | DIASTOLIC BLOOD PRESSURE: 74 MMHG

## 2021-05-14 NOTE — PROGRESS NOTES
Progress Note      Patient Name: Sowmya Mac   Patient ID: [id number]   Sex: Female   YOB: 1975    Primary Care Provider: Pauline KIRK   Referring Provider: Pauline KIRK    Visit Date: February 3, 2021    Provider: YELENA Sales   Location: [facility]   Location Address: [address] [place] [address], [address]  [id number]   Location Phone: [phone]          Chief Complaint  · 3 month follow up      History Of Present Illness  Sowmya Mac is a 45 year old /Black female who presents for evaluation and treatment of:      patient is a 3 month follow up was informed last visit that she was borderline diabetic. Last A1C was 6.1% Patient complains of fatigue and is concerned about Vitamin D and iron levels. States that she has so intolernce to heat and has a mass on the right side of her neck that she was informed by a previous physician to continue monitoring. Last free thryroxine level was 1.2ng/dL and TSH 1.380 were done in July 2020.     Hx HTN: Patient is currently on amlodipine 10 mg daily and metoprolol XL 50 mg daily. States that she is tolerating these doses with no adverse side effects.    She was scheduled for mammogram in September but she was unaware of that appointment.  She needs to reschedule her mammogram appointment.    History of vitamin D deficiency: She is currently taking vitamin D3 2000 units daily.    History of anemia, she would like her iron levels rechecked.       Past Medical History  Disease Name Date Onset Notes   Allergies --  --    Anemia --  --    Anxiety --  --    Corns and callus --  --    Decubitus ulcer of foot, stage 1 04/04/2018 --    Eczema --  --    Essential hypertension 04/22/2020 --    Foot pain, right 04/04/2018 --    Gall stones --  --    Heat intolerance 07/17/2020 --    Hernia --  --    High blood pressure --  --    Night sweat --  --    Sinus trouble --  --     Vitamin D deficiency 04/22/2020 --          Past Surgical History  Procedure Name Date Notes   Cholecstectomy --  --    Hysterectomy 2013 partial         Medication List  Name Date Started Instructions   amlodipine 10 mg oral tablet 12/11/2020 take 1 tablet (10 mg) by oral route once daily for 90 days   clonidine HCl 0.1 mg oral tablet 12/11/2020 take 1 tablet (0.1 mg) by oral route 2 times per day for 30 days   magnesium 250 mg oral tablet 12/05/2019 take 1 tablet by oral route daily for 90 days   Toprol XL 50 mg oral tablet extended release 24 hr 12/11/2020 take 1 tablet (50 mg) by oral route once daily for 90 days   Vitamin D3 50 mcg (2,000 unit) oral capsule 10/22/2020 TAKE 1 CAPSULE BY ORAL ROUTE DAILY FOR 30 DAYS for 90 days         Allergy List  Allergen Name Date Reaction Notes   NO KNOWN DRUG ALLERGIES --  --  --          Family Medical History  Disease Name Relative/Age Notes   Heart Disease Father/  Mother/   --    Diabetes Father/   --          Social History  Finding Status Start/Stop Quantity Notes   Alcohol Current some day --/-- --  Occasionlly   Tobacco Current every day 16/-- 1/2 Pack QD --          Immunizations  NameDate Admin Mfg Trade Name Lot Number Route Inj VIS Given VIS Publication   InfluenzaRefused 10/28/2020 NE Not Entered  NE NE     Comments:          Review of Systems  · Constitutional  o Denies  o : fever, fatigue, weight loss, weight gain  · Cardiovascular  o Denies  o : lower extremity edema, claudication, chest pressure, palpitations  · Respiratory  o Denies  o : shortness of breath, wheezing, cough, hemoptysis, dyspnea on exertion  · Gastrointestinal  o Denies  o : nausea, vomiting, diarrhea, constipation, abdominal pain  · Endocrine  o Admits  o : heat intolerance  o Denies  o : polyuria, polydipsia, cold intolerance, weight gain      Vitals  Date Time BP Position Site L\R Cuff Size HR RR TEMP (F) WT  HT  BMI kg/m2 BSA m2 O2 Sat FR L/min FiO2 HC       02/03/2021 01:19 /74  "Sitting    89 - R  98.2 226lbs 4oz 5'  9\" 33.41 2.24 97 %            Physical Examination  · Constitutional  o Appearance  o : no acute distress, well-nourished  · Head and Face  o Head  o :   § Inspection  § : atraumatic, normocephalic  · Neck  o Thyroid  o : right-sided thyromegaly present, right nodule present   · Respiratory  o Respiratory Effort  o : breathing comfortably, symmetric chest rise  o Auscultation of Lungs  o : clear to asculatation bilaterally, no wheezes, rales, or rhonchii  · Cardiovascular  o Heart  o :   § Auscultation of Heart  § : regular rate and rhythm, no murmurs, rubs, or gallops  o Peripheral Vascular System  o :   § Extremities  § : no edema  · Lymphatic  o Neck  o : no lymphadenopathy present  · Psychiatric  o General  o : normal mood and affect          Assessment  · Anemia     285.9/D64.9  Patient lab values checked today. Will call with results.  · Essential hypertension     401.9/I10  Patient stable on blood pressure medication as listed.  · Fatigue     780.79/R53.83  Patient lab values Thyroid panel, B12/folate, iron, and vitamin D level checked today. Will call with results.  · Impaired fasting glucose     790.21/R73.01  Patient A1C drawn today. Will call with results.  · Vitamin D deficiency     268.9/E55.9  Patient lab value drawn today. Will call with results.   · Thyroid enlargement     240.9/E04.9  Ordered thyroid ultrasound. Lab drawn today. Will call with results.      Plan  · Orders  o B12 Folate levels (B12FO) - 285.9/D64.9 - 02/03/2021  o CBC with Auto Diff [facility] (16450) - 285.9/D64.9 - 02/03/2021  o Iron panel (iron, TIBC, transferrin saturation) (36710, 38722, 43054) - 285.9/D64.9 - 02/03/2021  o Ferritin ser/plas (25778) - 285.9/D64.9 - 02/03/2021  o HTN/Lipid Panel (CMP, Lipid) [facility] (55414, 62972) - 401.9/I10, 780.79/R53.83 - 02/03/2021  o Thyroid Profile (00435, 66608, THYII) - 780.79/R53.83 - 02/03/2021  o Hgb A1c [facility] (31747) - 790.21/R73.01 - 02/03/2021  o Vitamin D " Level (88861) - 268.9/E55.9 - 02/03/2021  o ACO-18: Negative screen for clinical depression using a standardized tool () - - 02/03/2021   2 pts.  o ACO-39: Current medications updated and reviewed (1159F, ) - - 02/03/2021  o Thyroid Ultrasound. (04069) - 240.9/E04.9 - 02/03/2021  · Medications  o Medications have been Reconciled  o Transition of Care or Provider Policy  · Instructions  o Instructed patient to watch their diet and exercise.  o Patient was educated/instructed on their diagnosis, treatment and medications prior to discharge from the clinic today.  o Patient counseled to stop smoking.  o Patient instructed to seek medical attention urgently for new or worsening symptoms.  o Call the office with any concerns or questions.  · Disposition  o Return to clinic in 6 months            Electronically Signed by: YELENA Sales -Author on February 3, 2021 04:00:49 PM

## 2021-05-15 VITALS
HEART RATE: 85 BPM | DIASTOLIC BLOOD PRESSURE: 74 MMHG | HEIGHT: 69 IN | SYSTOLIC BLOOD PRESSURE: 128 MMHG | OXYGEN SATURATION: 98 % | WEIGHT: 220.25 LBS | TEMPERATURE: 98.3 F | BODY MASS INDEX: 32.62 KG/M2

## 2021-05-15 VITALS
DIASTOLIC BLOOD PRESSURE: 80 MMHG | TEMPERATURE: 99 F | OXYGEN SATURATION: 99 % | WEIGHT: 231.12 LBS | HEART RATE: 92 BPM | HEIGHT: 69 IN | SYSTOLIC BLOOD PRESSURE: 138 MMHG | BODY MASS INDEX: 34.23 KG/M2

## 2021-05-15 VITALS
WEIGHT: 227.12 LBS | OXYGEN SATURATION: 97 % | HEART RATE: 93 BPM | TEMPERATURE: 98.5 F | SYSTOLIC BLOOD PRESSURE: 150 MMHG | DIASTOLIC BLOOD PRESSURE: 96 MMHG | HEIGHT: 69 IN | BODY MASS INDEX: 33.64 KG/M2

## 2021-05-15 VITALS
SYSTOLIC BLOOD PRESSURE: 162 MMHG | OXYGEN SATURATION: 95 % | WEIGHT: 224.5 LBS | DIASTOLIC BLOOD PRESSURE: 86 MMHG | HEART RATE: 70 BPM | HEIGHT: 69 IN | BODY MASS INDEX: 33.25 KG/M2 | TEMPERATURE: 98.3 F

## 2021-05-15 VITALS
OXYGEN SATURATION: 100 % | TEMPERATURE: 97.6 F | DIASTOLIC BLOOD PRESSURE: 70 MMHG | SYSTOLIC BLOOD PRESSURE: 110 MMHG | HEART RATE: 100 BPM | HEIGHT: 69 IN | BODY MASS INDEX: 33.25 KG/M2 | WEIGHT: 224.5 LBS

## 2021-05-16 VITALS
HEIGHT: 69 IN | OXYGEN SATURATION: 99 % | SYSTOLIC BLOOD PRESSURE: 148 MMHG | DIASTOLIC BLOOD PRESSURE: 102 MMHG | TEMPERATURE: 98.3 F | HEART RATE: 69 BPM | WEIGHT: 205.25 LBS | BODY MASS INDEX: 30.4 KG/M2

## 2021-05-16 VITALS
OXYGEN SATURATION: 99 % | RESPIRATION RATE: 20 BRPM | HEIGHT: 69 IN | TEMPERATURE: 98.7 F | WEIGHT: 206 LBS | HEART RATE: 65 BPM | DIASTOLIC BLOOD PRESSURE: 90 MMHG | BODY MASS INDEX: 30.51 KG/M2 | SYSTOLIC BLOOD PRESSURE: 143 MMHG

## 2021-05-16 VITALS
HEIGHT: 69 IN | OXYGEN SATURATION: 98 % | WEIGHT: 209 LBS | DIASTOLIC BLOOD PRESSURE: 84 MMHG | BODY MASS INDEX: 30.96 KG/M2 | SYSTOLIC BLOOD PRESSURE: 138 MMHG | HEART RATE: 66 BPM | TEMPERATURE: 98.9 F

## 2021-05-16 VITALS
DIASTOLIC BLOOD PRESSURE: 88 MMHG | TEMPERATURE: 98.3 F | OXYGEN SATURATION: 98 % | HEIGHT: 69 IN | SYSTOLIC BLOOD PRESSURE: 138 MMHG | RESPIRATION RATE: 16 BRPM | HEART RATE: 73 BPM | WEIGHT: 209 LBS | BODY MASS INDEX: 30.96 KG/M2

## 2021-05-16 VITALS
HEIGHT: 69 IN | WEIGHT: 203.12 LBS | DIASTOLIC BLOOD PRESSURE: 86 MMHG | HEART RATE: 82 BPM | SYSTOLIC BLOOD PRESSURE: 140 MMHG | TEMPERATURE: 98.8 F | OXYGEN SATURATION: 98 % | BODY MASS INDEX: 30.08 KG/M2

## 2021-05-16 VITALS — WEIGHT: 206 LBS | HEART RATE: 78 BPM | HEIGHT: 69 IN | OXYGEN SATURATION: 100 % | BODY MASS INDEX: 30.51 KG/M2

## 2021-05-16 VITALS
OXYGEN SATURATION: 99 % | HEART RATE: 73 BPM | HEIGHT: 69 IN | TEMPERATURE: 98.2 F | DIASTOLIC BLOOD PRESSURE: 84 MMHG | SYSTOLIC BLOOD PRESSURE: 154 MMHG | BODY MASS INDEX: 33.5 KG/M2 | WEIGHT: 226.19 LBS

## 2021-05-16 VITALS — BODY MASS INDEX: 30.81 KG/M2 | HEIGHT: 69 IN | WEIGHT: 208 LBS | OXYGEN SATURATION: 100 % | HEART RATE: 77 BPM

## 2021-05-16 VITALS — DIASTOLIC BLOOD PRESSURE: 96 MMHG | SYSTOLIC BLOOD PRESSURE: 138 MMHG

## 2021-05-16 VITALS
HEIGHT: 69 IN | BODY MASS INDEX: 30.27 KG/M2 | DIASTOLIC BLOOD PRESSURE: 88 MMHG | HEART RATE: 72 BPM | OXYGEN SATURATION: 98 % | WEIGHT: 204.37 LBS | TEMPERATURE: 97.9 F | SYSTOLIC BLOOD PRESSURE: 142 MMHG

## 2021-05-22 ENCOUNTER — TRANSCRIBE ORDERS (OUTPATIENT)
Dept: FAMILY MEDICINE CLINIC | Facility: CLINIC | Age: 46
End: 2021-05-22

## 2021-05-22 DIAGNOSIS — Z12.31 SCREENING MAMMOGRAM, ENCOUNTER FOR: Primary | ICD-10-CM

## 2021-06-15 ENCOUNTER — TELEPHONE (OUTPATIENT)
Dept: FAMILY MEDICINE CLINIC | Facility: CLINIC | Age: 46
End: 2021-06-15

## 2021-06-15 NOTE — TELEPHONE ENCOUNTER
Caller: Sowmya Mac    Relationship: Self    Best call back number: [phone]    What form or medical record are you requesting: EMPLOYEE HEALTH FORM     Who is requesting this form or medical record from you: [employer]      How would you like to receive the form or medical records (pick-up, mail, fax): IN OFFICE    Timeframe paperwork needed: BEFORE, Thursday June 17.  PATIENT STATES THAT HER INTERVIEW WILL BE ON June 17 AND WILL NEED TO HAVE PAPERWORK TO TAKE WITH HER.     Additional notes: PATIENT DROPPED PAPERWORK OFF TO PROVIDER'S OFFICE ON Friday, June 11.     PLEASE CALL WHEN PAPERWORK IS READY -[phone].

## 2021-06-15 NOTE — TELEPHONE ENCOUNTER
Caller: Sowmya Mac    Relationship: Self    Best call back number: [phone]    What is the medical concern/diagnosis: PATIENT HAS A CALLOUS GROWING INWARD ON BOTTOM OF LEFT FOOT    What specialty or service is being requested: PODIATRIST    What is the provider, practice or medical service name:DR.JEFFREY WALSH    What is the office location: [address] [place] [address]    What is the office phone number:[phone]    Any additional details:PATIENT STATES THAT SHE HAS AN APPOINTMENT ON July 9 AT 3:30 PM AND THE OFFICE STILL NEEDS THE REFERRAL FOR THE PATIENT TO BE SEEN.

## 2021-06-16 ENCOUNTER — TELEPHONE (OUTPATIENT)
Dept: FAMILY MEDICINE CLINIC | Facility: CLINIC | Age: 46
End: 2021-06-16

## 2021-06-16 NOTE — TELEPHONE ENCOUNTER
Left voicemail stating patient will need to schedule an appt with her PCP to have her paperwork filled out.

## 2021-06-18 ENCOUNTER — OFFICE VISIT (OUTPATIENT)
Dept: FAMILY MEDICINE CLINIC | Facility: CLINIC | Age: 46
End: 2021-06-18

## 2021-06-18 VITALS
BODY MASS INDEX: 31.73 KG/M2 | DIASTOLIC BLOOD PRESSURE: 68 MMHG | OXYGEN SATURATION: 100 % | TEMPERATURE: 97.5 F | WEIGHT: 221.6 LBS | HEART RATE: 101 BPM | HEIGHT: 70 IN | SYSTOLIC BLOOD PRESSURE: 128 MMHG

## 2021-06-18 DIAGNOSIS — M79.672 LEFT FOOT PAIN: ICD-10-CM

## 2021-06-18 DIAGNOSIS — R20.2 NUMBNESS AND TINGLING IN LEFT HAND: ICD-10-CM

## 2021-06-18 DIAGNOSIS — G56.02 CARPAL TUNNEL SYNDROME OF LEFT WRIST: ICD-10-CM

## 2021-06-18 DIAGNOSIS — R22.1 NECK MASS: ICD-10-CM

## 2021-06-18 DIAGNOSIS — L84 CORNS AND CALLUS: Primary | ICD-10-CM

## 2021-06-18 DIAGNOSIS — R73.01 IMPAIRED FASTING GLUCOSE: ICD-10-CM

## 2021-06-18 DIAGNOSIS — R20.0 NUMBNESS AND TINGLING IN LEFT HAND: ICD-10-CM

## 2021-06-18 DIAGNOSIS — I10 ESSENTIAL HYPERTENSION: ICD-10-CM

## 2021-06-18 DIAGNOSIS — R68.2 DRY MOUTH, UNSPECIFIED: ICD-10-CM

## 2021-06-18 PROCEDURE — 99214 OFFICE O/P EST MOD 30 MIN: CPT | Performed by: NURSE PRACTITIONER

## 2021-06-18 RX ORDER — CLONIDINE HYDROCHLORIDE 0.1 MG/1
1 TABLET ORAL 2 TIMES DAILY
COMMUNITY
Start: 2020-12-11 | End: 2021-07-06

## 2021-06-18 RX ORDER — MULTIVITAMIN WITH IRON
1 TABLET ORAL DAILY
COMMUNITY
Start: 2021-03-26

## 2021-06-18 RX ORDER — METOPROLOL SUCCINATE 50 MG/1
1 TABLET, EXTENDED RELEASE ORAL DAILY
COMMUNITY
Start: 2020-12-11 | End: 2021-07-19

## 2021-06-18 RX ORDER — IBUPROFEN 200 MG
1 TABLET ORAL EVERY 4 HOURS PRN
COMMUNITY
End: 2021-11-12

## 2021-06-18 RX ORDER — ACETAMINOPHEN 160 MG
1 TABLET,DISINTEGRATING ORAL DAILY
COMMUNITY
End: 2021-10-05

## 2021-06-18 RX ORDER — AMLODIPINE BESYLATE 10 MG/1
1 TABLET ORAL DAILY
COMMUNITY
Start: 2020-12-11 | End: 2021-07-01

## 2021-06-18 NOTE — PROGRESS NOTES
"Chief Complaint  Follow-up (paper work and referral)    Subjective          Sowmya Mac presents to [facility]  History of Present Illness  She is here today for paperwork to be filled out for accommodations for [employer].    She has some calluses on bilateral feet that are growing inward and she has had to have one cut out on her right foot. She has another one on her left foot that causes her pain. She states that a footstool would help with this. She follows with Dr. Shelton and she needs a new referral.    She has carpal tunnel of the left wrist that causes numbness and tingling in her fingers. 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 She is needing ergonomic keyboard and mouse.    Hypertension: Blood pressure stable at 128/68 on amlodipine 10 mg daily, clonidine 0.1 mg twice daily and metoprolol 50 mg daily. She states that medications cause her to have dry mouth and she needs frequent drinks and will also need frequent bathroom breaks.    She has a mass that is on her right side of her neck below her thyroid that she states comes and goes. Her thyroid levels in the past have been normal.    She does have a history of impaired fasting glucose: Her last A1c was 6.0% on 2/3/2021.  Objective   Vital Signs:   /68   Pulse 101   Temp 97.5 °F (36.4 °C)   Ht 177.8 cm (70\")   Wt 101 kg (221 lb 9.6 oz)   SpO2 100%   BMI 31.80 kg/m²     Physical Exam  Vitals reviewed.   Constitutional:       Appearance: Normal appearance. She is well-developed.   Neck:      Thyroid: No thyroid mass, thyromegaly or thyroid tenderness.        Comments: Swelling/mass noted to right lower anterior cervical neck, nontender.  Cardiovascular:      Rate and Rhythm: Normal rate and regular rhythm.      Heart sounds: No murmur heard.   No friction rub. No gallop.    Pulmonary:      Effort: Pulmonary effort is normal.      Breath sounds: Normal breath sounds. No wheezing or rhonchi. "   Lymphadenopathy:      Cervical: No cervical adenopathy.   Skin:     General: Skin is warm and dry.   Neurological:      Mental Status: She is alert and oriented to person, place, and time.      Cranial Nerves: No cranial nerve deficit.   Psychiatric:         Mood and Affect: Mood and affect normal.         Behavior: Behavior normal.         Thought Content: Thought content normal. Thought content does not include homicidal or suicidal ideation.         Judgment: Judgment normal.        Result Review :                 Assessment and Plan    Diagnoses and all orders for this visit:    1. Corns and callus (Primary)  -     Ambulatory Referral to Podiatry    2. Left foot pain  -     Ambulatory Referral to Podiatry    3. Carpal tunnel syndrome of left wrist    4. Numbness and tingling in left hand    5. Essential hypertension  Assessment & Plan:  Hypertension is unchanged.  Continue current treatment regimen.  Blood pressure will be reassessed at the next regular appointment.    Orders:  -     Cancel: TSH+Free T4  -     Cancel: Basic Metabolic Panel  -     Cancel: CBC & Differential  -     Basic Metabolic Panel; Future  -     CBC & Differential; Future  -     TSH+Free T4; Future    6. Dry mouth, unspecified    7. Impaired fasting glucose  -     Cancel: Hemoglobin A1c  -     Hemoglobin A1c; Future    8. Neck mass  -     CT Soft Tissue Neck With Contrast; Future  -     Cancel: TSH+Free T4  -     TSH+Free T4; Future      Follow Up   No follow-ups on file.  Patient was given instructions and counseling regarding her condition or for health maintenance advice. Please see specific information pulled into the AVS if appropriate.

## 2021-06-22 ENCOUNTER — TELEPHONE (OUTPATIENT)
Dept: FAMILY MEDICINE CLINIC | Facility: CLINIC | Age: 46
End: 2021-06-22

## 2021-06-22 ENCOUNTER — LAB (OUTPATIENT)
Dept: FAMILY MEDICINE CLINIC | Facility: CLINIC | Age: 46
End: 2021-06-22

## 2021-06-22 DIAGNOSIS — I10 ESSENTIAL HYPERTENSION: ICD-10-CM

## 2021-06-22 DIAGNOSIS — R22.1 NECK MASS: ICD-10-CM

## 2021-06-22 DIAGNOSIS — R73.01 IMPAIRED FASTING GLUCOSE: ICD-10-CM

## 2021-06-22 LAB
ANION GAP SERPL CALCULATED.3IONS-SCNC: 10.4 MMOL/L (ref 5–15)
BASOPHILS # BLD AUTO: 0.04 10*3/MM3 (ref 0–0.2)
BASOPHILS NFR BLD AUTO: 0.6 % (ref 0–1.5)
BUN SERPL-MCNC: 8 MG/DL (ref 6–20)
BUN/CREAT SERPL: 7.5 (ref 7–25)
CALCIUM SPEC-SCNC: 9.4 MG/DL (ref 8.6–10.5)
CHLORIDE SERPL-SCNC: 101 MMOL/L (ref 98–107)
CO2 SERPL-SCNC: 25.6 MMOL/L (ref 22–29)
CREAT SERPL-MCNC: 1.07 MG/DL (ref 0.57–1)
DEPRECATED RDW RBC AUTO: 46.2 FL (ref 37–54)
EOSINOPHIL # BLD AUTO: 0.19 10*3/MM3 (ref 0–0.4)
EOSINOPHIL NFR BLD AUTO: 2.9 % (ref 0.3–6.2)
ERYTHROCYTE [DISTWIDTH] IN BLOOD BY AUTOMATED COUNT: 13.8 % (ref 12.3–15.4)
GFR SERPL CREATININE-BSD FRML MDRD: 67 ML/MIN/1.73
GLUCOSE SERPL-MCNC: 96 MG/DL (ref 65–99)
HBA1C MFR BLD: 5.79 % (ref 4.8–5.6)
HCT VFR BLD AUTO: 47.6 % (ref 34–46.6)
HGB BLD-MCNC: 15 G/DL (ref 12–15.9)
IMM GRANULOCYTES # BLD AUTO: 0.01 10*3/MM3 (ref 0–0.05)
IMM GRANULOCYTES NFR BLD AUTO: 0.2 % (ref 0–0.5)
LYMPHOCYTES # BLD AUTO: 2.33 10*3/MM3 (ref 0.7–3.1)
LYMPHOCYTES NFR BLD AUTO: 36 % (ref 19.6–45.3)
MCH RBC QN AUTO: 28.4 PG (ref 26.6–33)
MCHC RBC AUTO-ENTMCNC: 31.5 G/DL (ref 31.5–35.7)
MCV RBC AUTO: 90 FL (ref 79–97)
MONOCYTES # BLD AUTO: 0.6 10*3/MM3 (ref 0.1–0.9)
MONOCYTES NFR BLD AUTO: 9.3 % (ref 5–12)
NEUTROPHILS NFR BLD AUTO: 3.3 10*3/MM3 (ref 1.7–7)
NEUTROPHILS NFR BLD AUTO: 51 % (ref 42.7–76)
NRBC BLD AUTO-RTO: 0 /100 WBC (ref 0–0.2)
PLATELET # BLD AUTO: 250 10*3/MM3 (ref 140–450)
PMV BLD AUTO: 11.5 FL (ref 6–12)
POTASSIUM SERPL-SCNC: 4.4 MMOL/L (ref 3.5–5.2)
RBC # BLD AUTO: 5.29 10*6/MM3 (ref 3.77–5.28)
SODIUM SERPL-SCNC: 137 MMOL/L (ref 136–145)
WBC # BLD AUTO: 6.47 10*3/MM3 (ref 3.4–10.8)

## 2021-06-22 PROCEDURE — 80048 BASIC METABOLIC PNL TOTAL CA: CPT | Performed by: NURSE PRACTITIONER

## 2021-06-22 PROCEDURE — 83036 HEMOGLOBIN GLYCOSYLATED A1C: CPT | Performed by: NURSE PRACTITIONER

## 2021-06-22 PROCEDURE — 84439 ASSAY OF FREE THYROXINE: CPT | Performed by: NURSE PRACTITIONER

## 2021-06-22 PROCEDURE — 85025 COMPLETE CBC W/AUTO DIFF WBC: CPT | Performed by: NURSE PRACTITIONER

## 2021-06-22 PROCEDURE — 36415 COLL VENOUS BLD VENIPUNCTURE: CPT | Performed by: NURSE PRACTITIONER

## 2021-06-22 PROCEDURE — 84443 ASSAY THYROID STIM HORMONE: CPT | Performed by: NURSE PRACTITIONER

## 2021-06-22 NOTE — TELEPHONE ENCOUNTER
Left voicemail for patient stating the letter that she needed Pauline Coates to write is ready to be picked up at her convenience 7am-5pm.

## 2021-06-23 LAB
T4 FREE SERPL-MCNC: 1.02 NG/DL (ref 0.93–1.7)
TSH SERPL DL<=0.05 MIU/L-ACNC: 1.46 UIU/ML (ref 0.27–4.2)

## 2021-07-01 RX ORDER — AMLODIPINE BESYLATE 10 MG/1
10 TABLET ORAL DAILY
Qty: 90 TABLET | Refills: 0 | Status: SHIPPED | OUTPATIENT
Start: 2021-07-01 | End: 2021-07-19

## 2021-07-06 RX ORDER — CLONIDINE HYDROCHLORIDE 0.1 MG/1
TABLET ORAL
Qty: 180 TABLET | Refills: 0 | Status: SHIPPED | OUTPATIENT
Start: 2021-07-06 | End: 2021-07-19

## 2021-07-19 ENCOUNTER — TELEPHONE (OUTPATIENT)
Dept: FAMILY MEDICINE CLINIC | Facility: CLINIC | Age: 46
End: 2021-07-19

## 2021-07-19 RX ORDER — METOPROLOL SUCCINATE 50 MG/1
50 TABLET, EXTENDED RELEASE ORAL DAILY
Qty: 90 TABLET | Refills: 0 | Status: SHIPPED | OUTPATIENT
Start: 2021-07-19 | End: 2021-10-05 | Stop reason: SDUPTHER

## 2021-07-19 RX ORDER — CLONIDINE HYDROCHLORIDE 0.1 MG/1
0.1 TABLET ORAL 2 TIMES DAILY
Qty: 180 TABLET | Refills: 0 | Status: SHIPPED | OUTPATIENT
Start: 2021-07-19 | End: 2021-10-05 | Stop reason: SDUPTHER

## 2021-07-19 RX ORDER — AMLODIPINE BESYLATE 10 MG/1
10 TABLET ORAL DAILY
Qty: 90 TABLET | Refills: 0 | Status: SHIPPED | OUTPATIENT
Start: 2021-07-19 | End: 2021-10-05 | Stop reason: SDUPTHER

## 2021-07-19 NOTE — TELEPHONE ENCOUNTER
Caller: Sowmya Mac    Relationship: Self    Best call back number: [phone]     What is the best time to reach you: ANY    Who are you requesting to speak with (clinical staff, provider,  specific staff member):  MATIAS LALA    What was the call regarding: PATIENT CALLED AND NOTICED THAT ON HER cloNIDine (CATAPRES) 0.1 MG tablet AND metoprolol succinate XL (Toprol XL) 50 MG 24 hr tablet THERE WERE NO REFILLS. SHE STATED THAT THE PHARMACY HAD NO REFILLS FOR HER. WHEN I ATTEMPTED TO PUT IN AN ORDER FOR THESE MEDS, IT LOOKED LIKE SPIKE ALREADY PUT IN AN ORDER. IF I ADVISED THE PATIENT WRONG PLEASE CALL BACK AND ADVISE, THANK YOU

## 2021-07-22 ENCOUNTER — APPOINTMENT (OUTPATIENT)
Dept: CT IMAGING | Facility: HOSPITAL | Age: 46
End: 2021-07-22

## 2021-10-05 ENCOUNTER — TELEPHONE (OUTPATIENT)
Dept: FAMILY MEDICINE CLINIC | Facility: CLINIC | Age: 46
End: 2021-10-05

## 2021-10-05 RX ORDER — MULTIVIT-MIN/IRON/FOLIC ACID/K 18-600-40
2000 CAPSULE ORAL DAILY
Qty: 90 CAPSULE | Refills: 0 | Status: SHIPPED | OUTPATIENT
Start: 2021-10-05 | End: 2022-02-24 | Stop reason: SDUPTHER

## 2021-10-05 RX ORDER — CLONIDINE HYDROCHLORIDE 0.1 MG/1
0.1 TABLET ORAL 2 TIMES DAILY
Qty: 180 TABLET | Refills: 0 | Status: SHIPPED | OUTPATIENT
Start: 2021-10-05 | End: 2022-01-31

## 2021-10-05 RX ORDER — AMLODIPINE BESYLATE 10 MG/1
10 TABLET ORAL DAILY
Qty: 90 TABLET | Refills: 0 | Status: SHIPPED | OUTPATIENT
Start: 2021-10-05 | End: 2022-01-31 | Stop reason: SDUPTHER

## 2021-10-05 RX ORDER — METOPROLOL SUCCINATE 50 MG/1
50 TABLET, EXTENDED RELEASE ORAL DAILY
Qty: 90 TABLET | Refills: 0 | Status: SHIPPED | OUTPATIENT
Start: 2021-10-05 | End: 2022-01-31 | Stop reason: SDUPTHER

## 2021-10-05 RX ORDER — MULTIVIT-MIN/IRON/FOLIC ACID/K 18-600-40
CAPSULE ORAL
Qty: 90 CAPSULE | Refills: 0 | Status: SHIPPED | OUTPATIENT
Start: 2021-10-05 | End: 2021-10-05 | Stop reason: SDUPTHER

## 2021-10-25 RX ORDER — METOPROLOL SUCCINATE 50 MG/1
TABLET, EXTENDED RELEASE ORAL
Qty: 90 TABLET | Refills: 0 | OUTPATIENT
Start: 2021-10-25

## 2021-12-15 ENCOUNTER — TELEPHONE (OUTPATIENT)
Dept: FAMILY MEDICINE CLINIC | Facility: CLINIC | Age: 46
End: 2021-12-15

## 2021-12-15 DIAGNOSIS — M79.672 LEFT FOOT PAIN: ICD-10-CM

## 2021-12-15 DIAGNOSIS — L84 CORNS AND CALLUS: Primary | ICD-10-CM

## 2021-12-15 NOTE — TELEPHONE ENCOUNTER
Caller: Sowmya Mac    Relationship: Self    Best call back number: [phone]     What specialty or service is being requested: PODIATRY     What is the provider, practice or medical service name: DR. KANE WALSH    Any additional details: THE PATIENT HAS AN APPOINTMENT WITH THIS PROVIDER 12/20 AT 3:45. SHE EXPLAINED SHE IS NEEDING THIS DONE AS SOON AS POSSIBLE.

## 2022-01-31 RX ORDER — METOPROLOL SUCCINATE 50 MG/1
50 TABLET, EXTENDED RELEASE ORAL DAILY
Qty: 90 TABLET | Refills: 0 | Status: SHIPPED | OUTPATIENT
Start: 2022-01-31 | End: 2022-05-04

## 2022-01-31 RX ORDER — CLONIDINE HYDROCHLORIDE 0.1 MG/1
TABLET ORAL
Qty: 180 TABLET | Refills: 0 | OUTPATIENT
Start: 2022-01-31

## 2022-01-31 RX ORDER — CLONIDINE HYDROCHLORIDE 0.1 MG/1
TABLET ORAL
Qty: 180 TABLET | Refills: 0 | Status: SHIPPED | OUTPATIENT
Start: 2022-01-31 | End: 2022-05-13 | Stop reason: SDUPTHER

## 2022-01-31 RX ORDER — AMLODIPINE BESYLATE 10 MG/1
10 TABLET ORAL DAILY
Qty: 90 TABLET | Refills: 0 | Status: SHIPPED | OUTPATIENT
Start: 2022-01-31 | End: 2022-02-02 | Stop reason: SDUPTHER

## 2022-01-31 RX ORDER — METOPROLOL SUCCINATE 50 MG/1
TABLET, EXTENDED RELEASE ORAL
Qty: 30 TABLET | Refills: 0 | OUTPATIENT
Start: 2022-01-31

## 2022-01-31 NOTE — TELEPHONE ENCOUNTER
Left voicemail for patient to call to schedule an appt so medications can be refilled. Last follow up was 06/18/2021.

## 2022-01-31 NOTE — TELEPHONE ENCOUNTER
Caller: Estefania Sowmya MICHAEL    Relationship: Self    Best call back number: [phone]    Requested Prescriptions:   Requested Prescriptions     Pending Prescriptions Disp Refills   • metoprolol succinate XL (TOPROL-XL) 50 MG 24 hr tablet 90 tablet 0     Sig: Take 1 tablet by mouth Daily.   • amLODIPine (NORVASC) 10 MG tablet 90 tablet 0     Sig: Take 1 tablet by mouth Daily.        Pharmacy where request should be sent: [address] [phone]  - [phone] FX     Additional details provided by patient:     Does the patient have less than a 3 day supply:  [x] Yes  [] No    Vinny Zayas Rep   01/31/22 12:13 EST

## 2022-02-02 ENCOUNTER — OFFICE VISIT (OUTPATIENT)
Dept: FAMILY MEDICINE CLINIC | Facility: CLINIC | Age: 47
End: 2022-02-02

## 2022-02-02 VITALS
WEIGHT: 224.6 LBS | HEART RATE: 77 BPM | BODY MASS INDEX: 32.16 KG/M2 | SYSTOLIC BLOOD PRESSURE: 124 MMHG | HEIGHT: 70 IN | OXYGEN SATURATION: 99 % | DIASTOLIC BLOOD PRESSURE: 82 MMHG | TEMPERATURE: 97.8 F

## 2022-02-02 DIAGNOSIS — Z12.31 ENCOUNTER FOR SCREENING MAMMOGRAM FOR MALIGNANT NEOPLASM OF BREAST: ICD-10-CM

## 2022-02-02 DIAGNOSIS — N64.52 NIPPLE DISCHARGE: Primary | ICD-10-CM

## 2022-02-02 DIAGNOSIS — I10 ESSENTIAL HYPERTENSION: ICD-10-CM

## 2022-02-02 DIAGNOSIS — R73.01 IMPAIRED FASTING GLUCOSE: ICD-10-CM

## 2022-02-02 DIAGNOSIS — E55.9 VITAMIN D DEFICIENCY: ICD-10-CM

## 2022-02-02 PROBLEM — K80.20 GALL STONES: Status: ACTIVE | Noted: 2022-02-02

## 2022-02-02 PROBLEM — K46.9 ABDOMINAL HERNIA: Status: ACTIVE | Noted: 2022-02-02

## 2022-02-02 PROBLEM — F41.9 ANXIETY: Status: ACTIVE | Noted: 2022-02-02

## 2022-02-02 PROBLEM — D64.9 ANEMIA: Status: ACTIVE | Noted: 2022-02-02

## 2022-02-02 PROBLEM — L30.9 ECZEMA: Status: ACTIVE | Noted: 2022-02-02

## 2022-02-02 PROCEDURE — 99214 OFFICE O/P EST MOD 30 MIN: CPT | Performed by: NURSE PRACTITIONER

## 2022-02-02 RX ORDER — AMLODIPINE BESYLATE 10 MG/1
10 TABLET ORAL DAILY
Qty: 90 TABLET | Refills: 1 | Status: SHIPPED | OUTPATIENT
Start: 2022-02-02 | End: 2022-05-13 | Stop reason: SDUPTHER

## 2022-02-02 NOTE — PROGRESS NOTES
"Chief Complaint  Hypertension    Subjective          Sowmya Mac presents to [facility]  History of Present Illness   46-year-old female presents today for 6-month follow-up.    She is complaining of chronic left breast issues. She states she has had a biopsy in the past with some stitches and had some difficulty. Mammogram was ordered last year in May and she has not done the mammogram. She states that she can express white nipple discharge bilaterally and has been able to for years.    Hypertension: Blood pressure stable 124/82 on amlodipine 10 mg, metoprolol XL 50 mg and clonidine 0.1 mg daily.    History of impaired fasting glucose: Her last A1c was 5.79% 7 months ago.    She does have a history of vitamin D insufficiency and currently taking vitamin D 2000 units daily.  Objective   Vital Signs:   /82   Pulse 77   Temp 97.8 °F (36.6 °C)   Ht 177.8 cm (70\")   Wt 102 kg (224 lb 9.6 oz)   SpO2 99%   BMI 32.23 kg/m²     Physical Exam  Vitals reviewed.   Constitutional:       Appearance: Normal appearance. She is well-developed.   Neck:      Thyroid: No thyroid mass, thyromegaly or thyroid tenderness.   Cardiovascular:      Rate and Rhythm: Normal rate and regular rhythm.      Heart sounds: No murmur heard.  No friction rub. No gallop.    Pulmonary:      Effort: Pulmonary effort is normal.      Breath sounds: Normal breath sounds. No wheezing or rhonchi.   Lymphadenopathy:      Cervical: No cervical adenopathy.   Skin:     General: Skin is warm and dry.   Neurological:      Mental Status: She is alert and oriented to person, place, and time.      Cranial Nerves: No cranial nerve deficit.   Psychiatric:         Mood and Affect: Mood and affect normal.         Behavior: Behavior normal.         Thought Content: Thought content normal. Thought content does not include homicidal or suicidal ideation.         Judgment: Judgment normal.        Result Review :               "   Assessment and Plan    Diagnoses and all orders for this visit:    1. Nipple discharge (Primary)  Comments:  Prolactin level with her labs, reassured patient.  Orders:  -     Prolactin; Future  -     TSH+Free T4; Future    2. Essential hypertension  Assessment & Plan:  Hypertension is improving with treatment.  Continue current treatment regimen.  Continue current medications.  Blood pressure will be reassessed at the next regular appointment.    Orders:  -     Comprehensive Metabolic Panel; Future  -     Lipid Panel; Future  -     TSH+Free T4; Future    3. Impaired fasting glucose  Comments:  We will recheck her A1c with her labs. Patient will obtain labs at a later date.  Orders:  -     Hemoglobin A1c; Future  -     TSH+Free T4; Future    4. Vitamin D deficiency  Comments:  We will check vitamin D with her labs.  Orders:  -     Vitamin D 25 Hydroxy; Future    5. Encounter for screening mammogram for malignant neoplasm of breast  Comments:  Advised patient she needs to get her mammogram done, order placed and phone number to schedule the appointment given to patient.  Orders:  -     Mammo Screening Digital Tomosynthesis Bilateral With CAD; Future    Other orders  -     amLODIPine (NORVASC) 10 MG tablet; Take 1 tablet by mouth Daily.  Dispense: 90 tablet; Refill: 1      Follow Up   Return in about 6 months (around 8/2/2022) for Next scheduled follow up.  Patient was given instructions and counseling regarding her condition or for health maintenance advice. Please see specific information pulled into the AVS if appropriate.

## 2022-02-24 NOTE — TELEPHONE ENCOUNTER
Caller: Estefania, Sowmya M    Relationship: Self    Best call back number: [phone]    Requested Prescriptions:   Requested Prescriptions     Pending Prescriptions Disp Refills   • Cholecalciferol (Vitamin D) 50 MCG (2000 UT) capsule 90 capsule 0     Sig: Take 1 capsule by mouth Daily.        [address] [facility] - [phone]  - [phone] FX         Does the patient have less than a 3 day supply:  [x] Yes  [] No    Vinny GONZALEZ Rep   02/24/22 13:05 EST

## 2022-02-25 RX ORDER — MULTIVIT-MIN/IRON/FOLIC ACID/K 18-600-40
2000 CAPSULE ORAL DAILY
Qty: 90 CAPSULE | Refills: 0 | Status: SHIPPED | OUTPATIENT
Start: 2022-02-25 | End: 2022-09-21

## 2022-03-18 ENCOUNTER — APPOINTMENT (OUTPATIENT)
Dept: MAMMOGRAPHY | Facility: HOSPITAL | Age: 47
End: 2022-03-18

## 2022-04-12 ENCOUNTER — APPOINTMENT (OUTPATIENT)
Dept: MAMMOGRAPHY | Facility: HOSPITAL | Age: 47
End: 2022-04-12

## 2022-05-04 RX ORDER — METOPROLOL SUCCINATE 50 MG/1
TABLET, EXTENDED RELEASE ORAL
Qty: 90 TABLET | Refills: 0 | Status: SHIPPED | OUTPATIENT
Start: 2022-05-04 | End: 2022-07-28

## 2022-05-10 ENCOUNTER — TELEPHONE (OUTPATIENT)
Dept: FAMILY MEDICINE CLINIC | Facility: CLINIC | Age: 47
End: 2022-05-10

## 2022-05-10 PROCEDURE — 82962 GLUCOSE BLOOD TEST: CPT

## 2022-05-10 NOTE — TELEPHONE ENCOUNTER
Patient called from the [facility] in [place]. She stated that she left work because she is very dizzy. She stated she has been there for over 2 hours and is hungry. I advised her to stay there and ask if they have crackers or something to drink. Patient stated they only have soda and she doesn't drink that, said she wants to get something to ear. I advised her to stay but she stated she wanted to leave to get food. I suggested she go to the urgent care in [place] on [address] as it is may not be as busy but to get some food before she goes.

## 2022-05-13 ENCOUNTER — OFFICE VISIT (OUTPATIENT)
Dept: FAMILY MEDICINE CLINIC | Facility: CLINIC | Age: 47
End: 2022-05-13

## 2022-05-13 VITALS
TEMPERATURE: 98.7 F | DIASTOLIC BLOOD PRESSURE: 78 MMHG | HEART RATE: 79 BPM | OXYGEN SATURATION: 100 % | WEIGHT: 228 LBS | SYSTOLIC BLOOD PRESSURE: 142 MMHG | HEIGHT: 70 IN | BODY MASS INDEX: 32.64 KG/M2

## 2022-05-13 DIAGNOSIS — R73.01 IMPAIRED FASTING GLUCOSE: ICD-10-CM

## 2022-05-13 DIAGNOSIS — R53.83 FATIGUE, UNSPECIFIED TYPE: ICD-10-CM

## 2022-05-13 DIAGNOSIS — J32.9 SINUSITIS, UNSPECIFIED CHRONICITY, UNSPECIFIED LOCATION: ICD-10-CM

## 2022-05-13 DIAGNOSIS — Z11.59 NEED FOR HEPATITIS C SCREENING TEST: ICD-10-CM

## 2022-05-13 DIAGNOSIS — N64.89 OCCLUSION OF BREAST DUCT: ICD-10-CM

## 2022-05-13 DIAGNOSIS — I10 ESSENTIAL HYPERTENSION: Primary | ICD-10-CM

## 2022-05-13 DIAGNOSIS — E55.9 VITAMIN D DEFICIENCY: ICD-10-CM

## 2022-05-13 DIAGNOSIS — N64.52 NIPPLE DISCHARGE: ICD-10-CM

## 2022-05-13 LAB
25(OH)D3 SERPL-MCNC: 45.5 NG/ML (ref 30–100)
ALBUMIN SERPL-MCNC: 4.8 G/DL (ref 3.5–5.2)
ALBUMIN/GLOB SERPL: 1.7 G/DL
ALP SERPL-CCNC: 55 U/L (ref 39–117)
ALT SERPL W P-5'-P-CCNC: 15 U/L (ref 1–33)
ANION GAP SERPL CALCULATED.3IONS-SCNC: 13.4 MMOL/L (ref 5–15)
AST SERPL-CCNC: 17 U/L (ref 1–32)
BASOPHILS # BLD AUTO: 0.04 10*3/MM3 (ref 0–0.2)
BASOPHILS NFR BLD AUTO: 0.6 % (ref 0–1.5)
BILIRUB SERPL-MCNC: 0.8 MG/DL (ref 0–1.2)
BUN SERPL-MCNC: 9 MG/DL (ref 6–20)
BUN/CREAT SERPL: 12.9 (ref 7–25)
CALCIUM SPEC-SCNC: 9.9 MG/DL (ref 8.6–10.5)
CHLORIDE SERPL-SCNC: 101 MMOL/L (ref 98–107)
CHOLEST SERPL-MCNC: 185 MG/DL (ref 0–200)
CO2 SERPL-SCNC: 22.6 MMOL/L (ref 22–29)
CREAT SERPL-MCNC: 0.7 MG/DL (ref 0.57–1)
DEPRECATED RDW RBC AUTO: 41.3 FL (ref 37–54)
EGFRCR SERPLBLD CKD-EPI 2021: 108.2 ML/MIN/1.73
EOSINOPHIL # BLD AUTO: 0.13 10*3/MM3 (ref 0–0.4)
EOSINOPHIL NFR BLD AUTO: 2 % (ref 0.3–6.2)
ERYTHROCYTE [DISTWIDTH] IN BLOOD BY AUTOMATED COUNT: 13.3 % (ref 12.3–15.4)
FOLATE SERPL-MCNC: 10.6 NG/ML (ref 4.78–24.2)
GLOBULIN UR ELPH-MCNC: 2.9 GM/DL
GLUCOSE SERPL-MCNC: 87 MG/DL (ref 65–99)
HBA1C MFR BLD: 5.8 % (ref 4.8–5.6)
HCT VFR BLD AUTO: 43.1 % (ref 34–46.6)
HCV AB SER DONR QL: NORMAL
HDLC SERPL-MCNC: 46 MG/DL (ref 40–60)
HGB BLD-MCNC: 13.9 G/DL (ref 12–15.9)
IMM GRANULOCYTES # BLD AUTO: 0.01 10*3/MM3 (ref 0–0.05)
IMM GRANULOCYTES NFR BLD AUTO: 0.2 % (ref 0–0.5)
LDLC SERPL CALC-MCNC: 112 MG/DL (ref 0–100)
LDLC/HDLC SERPL: 2.37 {RATIO}
LYMPHOCYTES # BLD AUTO: 2.77 10*3/MM3 (ref 0.7–3.1)
LYMPHOCYTES NFR BLD AUTO: 41.6 % (ref 19.6–45.3)
MCH RBC QN AUTO: 28.1 PG (ref 26.6–33)
MCHC RBC AUTO-ENTMCNC: 32.3 G/DL (ref 31.5–35.7)
MCV RBC AUTO: 87.1 FL (ref 79–97)
MONOCYTES # BLD AUTO: 0.63 10*3/MM3 (ref 0.1–0.9)
MONOCYTES NFR BLD AUTO: 9.5 % (ref 5–12)
NEUTROPHILS NFR BLD AUTO: 3.08 10*3/MM3 (ref 1.7–7)
NEUTROPHILS NFR BLD AUTO: 46.1 % (ref 42.7–76)
NRBC BLD AUTO-RTO: 0 /100 WBC (ref 0–0.2)
PLATELET # BLD AUTO: 235 10*3/MM3 (ref 140–450)
PMV BLD AUTO: 10.9 FL (ref 6–12)
POTASSIUM SERPL-SCNC: 4.1 MMOL/L (ref 3.5–5.2)
PROLACTIN SERPL-MCNC: 24.4 NG/ML (ref 4.79–23.3)
PROT SERPL-MCNC: 7.7 G/DL (ref 6–8.5)
RBC # BLD AUTO: 4.95 10*6/MM3 (ref 3.77–5.28)
SODIUM SERPL-SCNC: 137 MMOL/L (ref 136–145)
T4 FREE SERPL-MCNC: 1.02 NG/DL (ref 0.93–1.7)
TRIGL SERPL-MCNC: 150 MG/DL (ref 0–150)
TSH SERPL DL<=0.05 MIU/L-ACNC: 2.4 UIU/ML (ref 0.27–4.2)
VIT B12 BLD-MCNC: 726 PG/ML (ref 211–946)
VLDLC SERPL-MCNC: 27 MG/DL (ref 5–40)
WBC NRBC COR # BLD: 6.66 10*3/MM3 (ref 3.4–10.8)

## 2022-05-13 PROCEDURE — 83036 HEMOGLOBIN GLYCOSYLATED A1C: CPT | Performed by: NURSE PRACTITIONER

## 2022-05-13 PROCEDURE — 82746 ASSAY OF FOLIC ACID SERUM: CPT | Performed by: NURSE PRACTITIONER

## 2022-05-13 PROCEDURE — 80061 LIPID PANEL: CPT | Performed by: NURSE PRACTITIONER

## 2022-05-13 PROCEDURE — 99214 OFFICE O/P EST MOD 30 MIN: CPT | Performed by: NURSE PRACTITIONER

## 2022-05-13 PROCEDURE — 84146 ASSAY OF PROLACTIN: CPT | Performed by: NURSE PRACTITIONER

## 2022-05-13 PROCEDURE — 86803 HEPATITIS C AB TEST: CPT | Performed by: NURSE PRACTITIONER

## 2022-05-13 PROCEDURE — 84439 ASSAY OF FREE THYROXINE: CPT | Performed by: NURSE PRACTITIONER

## 2022-05-13 PROCEDURE — 36415 COLL VENOUS BLD VENIPUNCTURE: CPT | Performed by: NURSE PRACTITIONER

## 2022-05-13 PROCEDURE — 85025 COMPLETE CBC W/AUTO DIFF WBC: CPT | Performed by: NURSE PRACTITIONER

## 2022-05-13 PROCEDURE — 80053 COMPREHEN METABOLIC PANEL: CPT | Performed by: NURSE PRACTITIONER

## 2022-05-13 PROCEDURE — 84443 ASSAY THYROID STIM HORMONE: CPT | Performed by: NURSE PRACTITIONER

## 2022-05-13 PROCEDURE — 82306 VITAMIN D 25 HYDROXY: CPT | Performed by: NURSE PRACTITIONER

## 2022-05-13 PROCEDURE — 82607 VITAMIN B-12: CPT | Performed by: NURSE PRACTITIONER

## 2022-05-13 RX ORDER — CLONIDINE HYDROCHLORIDE 0.1 MG/1
0.1 TABLET ORAL 2 TIMES DAILY
Qty: 180 TABLET | Refills: 1 | Status: SHIPPED | OUTPATIENT
Start: 2022-05-13 | End: 2023-01-03 | Stop reason: SDUPTHER

## 2022-05-13 RX ORDER — AMLODIPINE BESYLATE 10 MG/1
10 TABLET ORAL DAILY
Qty: 90 TABLET | Refills: 1 | Status: SHIPPED | OUTPATIENT
Start: 2022-05-13 | End: 2023-01-27 | Stop reason: SDUPTHER

## 2022-05-13 NOTE — PROGRESS NOTES
Chief Complaint  Fatigue and Dizziness    Subjective          Sowmya Mac presents to [facility]  History of Present Illness   46-year-old female presents today complaining of fatigue and dizziness for the past week.  She states she did go to the urgent care on 5/10/2022 and was treated for sinus infection and is currently on Augmentin.  She was also given Zyrtec.  She states she is starting to feel better.  She has some pending labs that she has not been able to get done yet.  He also has borderline diabetes.    She complains of her left breast with a raised area near her nipple.  She states that she is able to express nipple discharge that is white, sometimes bloody or yellow.  She states after she is able to express nipple discharge the raised area will decrease in size.  She states that she had breast surgery years ago due to this breast duct occlusion.  She has not had a mammogram done yet.    Hypertension: Blood pressure stable on medications as listed.    Vitamin D deficiency: Currently taking vitamin D 2000 units daily.    Current Outpatient Medications on File Prior to Visit   Medication Sig Dispense Refill   • amoxicillin-clavulanate (AUGMENTIN) 875-125 MG per tablet Take 1 tablet by mouth 2 (Two) Times a Day for 10 days. 20 tablet 0   • azelastine (ASTEPRO) 0.15 % solution nasal spray 2 sprays into the nostril(s) as directed by provider 2 (Two) Times a Day for 15 days. 30 mL 0   • cetirizine (zyrTEC) 10 MG tablet Take 1 tablet by mouth Daily for 30 days. 30 tablet 0   • Cholecalciferol (Vitamin D) 50 MCG (2000 UT) capsule Take 1 capsule by mouth Daily. 90 capsule 0   • fluconazole (DIFLUCAN) 150 MG tablet Take 1 tablet for symptoms, can take second tablet 72 hours later if symptoms persist. 2 tablet 0   • Magnesium 250 MG tablet Take 1 tablet by mouth Daily.     • metoprolol succinate XL (TOPROL-XL) 50 MG 24 hr tablet Take 1 tablet by mouth once daily 90 tablet 0   •  "[DISCONTINUED] amLODIPine (NORVASC) 10 MG tablet Take 1 tablet by mouth Daily. 90 tablet 1   • [DISCONTINUED] cloNIDine (CATAPRES) 0.1 MG tablet Take 1 tablet by mouth twice daily 180 tablet 0     No current facility-administered medications on file prior to visit.       Objective   Vital Signs:   /78   Pulse 79   Temp 98.7 °F (37.1 °C)   Ht 177.8 cm (70\")   Wt 103 kg (228 lb)   SpO2 100%   BMI 32.71 kg/m²     Physical Exam  Vitals reviewed.   Constitutional:       Appearance: Normal appearance. She is well-developed.   HENT:      Right Ear: Tympanic membrane, ear canal and external ear normal.      Left Ear: Tympanic membrane, ear canal and external ear normal.      Mouth/Throat:      Mouth: Mucous membranes are moist.      Pharynx: No pharyngeal swelling, oropharyngeal exudate or posterior oropharyngeal erythema.   Neck:      Thyroid: No thyroid mass, thyromegaly or thyroid tenderness.   Cardiovascular:      Rate and Rhythm: Normal rate and regular rhythm.      Heart sounds: No murmur heard.    No friction rub. No gallop.   Pulmonary:      Effort: Pulmonary effort is normal.      Breath sounds: Normal breath sounds. No wheezing or rhonchi.   Chest:       Lymphadenopathy:      Cervical: No cervical adenopathy.   Skin:     General: Skin is warm and dry.   Neurological:      Mental Status: She is alert and oriented to person, place, and time.      Cranial Nerves: No cranial nerve deficit.   Psychiatric:         Mood and Affect: Mood and affect normal.         Behavior: Behavior normal.         Thought Content: Thought content normal. Thought content does not include homicidal or suicidal ideation.         Judgment: Judgment normal.        Result Review :                 Assessment and Plan    Diagnoses and all orders for this visit:    1. Essential hypertension (Primary)  Assessment & Plan:  Hypertension is unchanged.  Continue current treatment regimen.  Continue current medications.  Blood pressure will " be reassessed at the next regular appointment.    Orders:  -     CBC Auto Differential  -     Comprehensive Metabolic Panel  -     Lipid Panel  -     TSH+Free T4    2. Sinusitis, unspecified chronicity, unspecified location  Comments:  I discussed with her that her sinusitis may be causing her dizziness and should be improving after she finishes the antibiotic.    3. Occlusion of breast duct  Comments:  Diagnostic mammo and US of left breast ordered,  I discussed with her after we get the results I will refer her to the breast surgeon, Dr. Miriam Knight.  Orders:  -     Mammo Diagnostic Digital Tomosynthesis Left With CAD; Future  -     US Breast Left Limited; Future    4. Nipple discharge  -     Prolactin  -     TSH+Free T4    5. Fatigue, unspecified type  -     Vitamin B12 & Folate  -     CBC Auto Differential    6. Impaired fasting glucose  -     Hemoglobin A1c  -     TSH+Free T4    7. Vitamin D deficiency  Comments:  We will check vitamin D with her labs.  Orders:  -     Vitamin D 25 Hydroxy    8. Need for hepatitis C screening test  -     Hepatitis C Antibody    Other orders  -     cloNIDine (CATAPRES) 0.1 MG tablet; Take 1 tablet by mouth 2 (Two) Times a Day.  Dispense: 180 tablet; Refill: 1  -     amLODIPine (NORVASC) 10 MG tablet; Take 1 tablet by mouth Daily.  Dispense: 90 tablet; Refill: 1      Follow Up   Return in about 3 weeks (around 6/3/2022) for to [facility] care with YELENA Barahona.  Patient was given instructions and counseling regarding her condition or for health maintenance advice. Please see specific information pulled into the AVS if appropriate.

## 2022-05-16 RX ORDER — AMLODIPINE BESYLATE 10 MG/1
10 TABLET ORAL DAILY
Qty: 90 TABLET | Refills: 1 | OUTPATIENT
Start: 2022-05-16

## 2022-05-16 NOTE — TELEPHONE ENCOUNTER
Caller: Estefania, Sowmya MICHAEL    Relationship: Self    Best call back number: [phone]    Requested Prescriptions:   Requested Prescriptions     Pending Prescriptions Disp Refills   • amLODIPine (NORVASC) 10 MG tablet 90 tablet 1     Sig: Take 1 tablet by mouth Daily.        Pharmacy where request should be sent: [address] [facility] - [phone]  - [phone] FX     Additional details provided by patient:  PATIENT HAS ABOUT A 7 DAY SUPPLY LEFT     Does the patient have less than a 3 day supply:  [] Yes  [x] No    Vinny Young Rep   05/16/22 16:08 EDT

## 2022-05-18 ENCOUNTER — TELEPHONE (OUTPATIENT)
Dept: FAMILY MEDICINE CLINIC | Facility: CLINIC | Age: 47
End: 2022-05-18

## 2022-05-18 RX ORDER — MECLIZINE HYDROCHLORIDE 25 MG/1
25 TABLET ORAL 3 TIMES DAILY PRN
Qty: 30 TABLET | Refills: 0 | Status: SHIPPED | OUTPATIENT
Start: 2022-05-18

## 2022-05-18 NOTE — TELEPHONE ENCOUNTER
Pt is still having dizzy spells and would like clinical to talk her about this. She states that she was told to call back if these continue

## 2022-05-18 NOTE — TELEPHONE ENCOUNTER
PT SAID SHE IS STILL FEELING DIZZY. WHEN TO ACUTE CARE AND SHE HAD FLUID BEHIND HER EARS AND THE MEDICATION IS NOT WORKING. I ASKED PT IF SHE HAD TRIED ANY VERTIGO EXERCISES AND SHE HAD NOT. PLEASE ADVISE.

## 2022-05-18 NOTE — TELEPHONE ENCOUNTER
I sent her in some meclizine to take for the dizziness, advised her that this can cause drowsiness and not to drive while she is taking it.  She is probably dizzy due to the sinus drainage.  Advised her to make sure she is using her nasal spray and taking her allergy pill to help with her symptoms.  She should follow-up if the dizziness continues.

## 2022-05-18 NOTE — TELEPHONE ENCOUNTER
SPOKE WITH PT AND SHE IS GOING TO COME UP HERE AND SHE IS GOING TO PICK HER SOME PAPERS ON VERTIGO

## 2022-06-01 ENCOUNTER — TELEPHONE (OUTPATIENT)
Dept: FAMILY MEDICINE CLINIC | Facility: CLINIC | Age: 47
End: 2022-06-01

## 2022-06-01 NOTE — TELEPHONE ENCOUNTER
Patient missed appointment on 05/27/2022 @ 0800 with Tejinder Sellers. Called number in chart - no answer - second attempt - sending letter

## 2022-06-09 ENCOUNTER — OFFICE VISIT (OUTPATIENT)
Dept: FAMILY MEDICINE CLINIC | Facility: CLINIC | Age: 47
End: 2022-06-09

## 2022-06-09 VITALS
SYSTOLIC BLOOD PRESSURE: 138 MMHG | BODY MASS INDEX: 32.5 KG/M2 | WEIGHT: 227 LBS | TEMPERATURE: 97.1 F | DIASTOLIC BLOOD PRESSURE: 80 MMHG | OXYGEN SATURATION: 99 % | HEART RATE: 100 BPM | HEIGHT: 70 IN

## 2022-06-09 DIAGNOSIS — Z09 FOLLOW-UP EXAM: Primary | ICD-10-CM

## 2022-06-09 DIAGNOSIS — E04.9 ENLARGED THYROID: ICD-10-CM

## 2022-06-09 DIAGNOSIS — R42 DIZZINESS: ICD-10-CM

## 2022-06-09 PROCEDURE — 99214 OFFICE O/P EST MOD 30 MIN: CPT | Performed by: NURSE PRACTITIONER

## 2022-06-09 NOTE — PROGRESS NOTES
Chief Complaint  Dizziness    Subjective          Medical History: has a past medical history of Allergies, Anemia, Anxiety, Condition not found, Corns and callus, Decubitus ulcer of foot, stage 1 (04/04/2018), Eczema, Essential hypertension (04/22/2020), Foot pain, right (04/04/2018), Gall stones, Heat intolerance (07/17/2020), High blood pressure, Impaired fasting glucose (02/03/2021), Night sweat, Sinus trouble, and Vitamin D deficiency (04/22/2020).     Surgical History: has a past surgical history that includes Cholecystectomy; Hysterectomy (2013); and Breast surgery.     Family History: family history includes Diabetes in her father; Heart disease in her father and mother.     Social History: reports that she has been smoking cigarettes. She started smoking about 21 years ago. She has a 21.00 pack-year smoking history. She has never used smokeless tobacco. She reports that she does not drink alcohol and does not use drugs.    Sowmya Mac presents to [facility]  Patient is a 46-year-old female who comes in to establish care.  Patient previously seen Pauline Coates nurse practitioner.  She is currently being treated for hypertension, impaired fasting glucose and vitamin D deficiency.    Patient is complaining of dizziness.  Patient states that she noticed it when her office went from the first floor to the third floor of her office building.  She has been seen previously for this complaint.  She was seen at urgent care 5/10/2022.  At that time she was diagnosed with a sinus infection, dizziness, and otitis media of the left ear.  Patient was prescribed Augmentin, as Astelin nasal spray, cetirizine for her conditions.  Patient is coming in today for follow-up.  Patient has had no improvement with the Augmentin, Claritin, or Azestelin.    Dizziness  This is a recurrent problem. The current episode started more than 1 month ago. The problem occurs intermittently. The  "problem has been waxing and waning. Pertinent negatives include no abdominal pain, congestion, fatigue or headaches. Exacerbated by: sitting to standing, at times. The treatment provided no relief.       Objective   Vital Signs:   /80   Pulse 100   Temp 97.1 °F (36.2 °C)   Ht 177.8 cm (70\")   Wt 103 kg (227 lb)   SpO2 99%   BMI 32.57 kg/m²     Physical Exam  Vitals reviewed.   Constitutional:       Appearance: Normal appearance. She is well-developed.   HENT:      Head: Normocephalic and atraumatic.      Right Ear: Tympanic membrane and external ear normal.      Left Ear: Tympanic membrane and external ear normal.   Eyes:      Conjunctiva/sclera: Conjunctivae normal.      Pupils: Pupils are equal, round, and reactive to light.   Neck:      Thyroid: Thyromegaly present.   Cardiovascular:      Rate and Rhythm: Normal rate and regular rhythm.      Heart sounds: No murmur heard.    No friction rub.   Pulmonary:      Effort: Pulmonary effort is normal.      Breath sounds: Normal breath sounds. No wheezing or rhonchi.   Skin:     General: Skin is warm and dry.   Neurological:      Mental Status: She is alert and oriented to person, place, and time.   Psychiatric:         Mood and Affect: Mood and affect normal.         Behavior: Behavior normal.         Thought Content: Thought content normal.         Judgment: Judgment normal.        Result Review :   The following data was reviewed by: YELENA Cisneros on 06/09/2022:  Common labs    Common Labsle 6/22/21 6/22/21 6/22/21 5/13/22 5/13/22 5/13/22 5/13/22    0855 0855 0855 1418 1418 1418 1418   Glucose   96  87     BUN   8  9     Creatinine   1.07 (A)  0.70     eGFR African Am   67       Sodium   137  137     Potassium   4.4  4.1     Chloride   101  101     Calcium   9.4  9.9     Albumin     4.80     Total Bilirubin     0.8     Alkaline Phosphatase     55     AST (SGOT)     17     ALT (SGPT)     15     WBC 6.47   6.66      Hemoglobin 15.0   13.9    "   Hematocrit 47.6 (A)   43.1      Platelets 250   235      Total Cholesterol      185    Triglycerides      150    HDL Cholesterol      46    LDL Cholesterol       112 (A)    Hemoglobin A1C  5.79 (A)     5.80 (A)   (A) Abnormal value            Data reviewed: Previous Pauline Coates notes/urgent care note            Current Outpatient Medications on File Prior to Visit   Medication Sig Dispense Refill   • amLODIPine (NORVASC) 10 MG tablet Take 1 tablet by mouth Daily. 90 tablet 1   • cetirizine (zyrTEC) 10 MG tablet Take 1 tablet by mouth Daily for 30 days. 30 tablet 0   • Cholecalciferol (Vitamin D) 50 MCG (2000 UT) capsule Take 1 capsule by mouth Daily. 90 capsule 0   • cloNIDine (CATAPRES) 0.1 MG tablet Take 1 tablet by mouth 2 (Two) Times a Day. 180 tablet 1   • fluconazole (DIFLUCAN) 150 MG tablet Take 1 tablet for symptoms, can take second tablet 72 hours later if symptoms persist. 2 tablet 0   • Magnesium 250 MG tablet Take 1 tablet by mouth Daily.     • meclizine (ANTIVERT) 25 MG tablet Take 1 tablet by mouth 3 (Three) Times a Day As Needed for Dizziness. 30 tablet 0   • metoprolol succinate XL (TOPROL-XL) 50 MG 24 hr tablet Take 1 tablet by mouth once daily 90 tablet 0     No current facility-administered medications on file prior to visit.        Assessment and Plan    Diagnoses and all orders for this visit:    1. Follow-up exam (Primary)    2. Dizziness  Comments:  No improvement with Epley maneuver, will start on Flonase and have patient follow-up in 1 month.  G discussed ust cutting her meclizine in half to avoid somnole    3. Enlarged thyroid  Comments:  Enlarged thyroid and exam we will get ultrasound to rule out any acute abnormalities  Orders:  -     US Thyroid; Future        Follow Up   No follow-ups on file.  Patient was given instructions and counseling regarding her condition or for health maintenance advice. Please see specific information pulled into the AVS if appropriate.

## 2022-06-17 ENCOUNTER — OFFICE VISIT (OUTPATIENT)
Dept: FAMILY MEDICINE CLINIC | Facility: CLINIC | Age: 47
End: 2022-06-17

## 2022-06-17 VITALS
HEIGHT: 70 IN | BODY MASS INDEX: 32.9 KG/M2 | TEMPERATURE: 96.9 F | WEIGHT: 229.8 LBS | DIASTOLIC BLOOD PRESSURE: 78 MMHG | OXYGEN SATURATION: 96 % | SYSTOLIC BLOOD PRESSURE: 128 MMHG | HEART RATE: 57 BPM

## 2022-06-17 DIAGNOSIS — J01.40 ACUTE NON-RECURRENT PANSINUSITIS: ICD-10-CM

## 2022-06-17 DIAGNOSIS — R42 DIZZINESS: ICD-10-CM

## 2022-06-17 DIAGNOSIS — Z09 FOLLOW-UP EXAM: Primary | ICD-10-CM

## 2022-06-17 PROCEDURE — 99213 OFFICE O/P EST LOW 20 MIN: CPT | Performed by: NURSE PRACTITIONER

## 2022-06-17 RX ORDER — CETIRIZINE HYDROCHLORIDE 10 MG/1
10 TABLET ORAL DAILY
Qty: 90 TABLET | Refills: 1 | Status: SHIPPED | OUTPATIENT
Start: 2022-06-17 | End: 2022-12-20

## 2022-06-17 NOTE — PROGRESS NOTES
Chief Complaint  Follow-up (1 month follow-up) and Dizziness    Subjective          Medical History: has a past medical history of Allergies, Anemia, Anxiety, Condition not found, Corns and callus, Decubitus ulcer of foot, stage 1 (04/04/2018), Eczema, Essential hypertension (04/22/2020), Foot pain, right (04/04/2018), Gall stones, Heat intolerance (07/17/2020), High blood pressure, Impaired fasting glucose (02/03/2021), Night sweat, Sinus trouble, and Vitamin D deficiency (04/22/2020).     Surgical History: has a past surgical history that includes Cholecystectomy; Hysterectomy (2013); and Breast surgery.     Family History: family history includes Diabetes in her father; Heart disease in her father and mother.     Social History: reports that she has been smoking cigarettes. She started smoking about 22 years ago. She has a 21.00 pack-year smoking history. She has never used smokeless tobacco. She reports that she does not drink alcohol and does not use drugs.    Sowmya RODRIGUEZ Estefania presents to [facility]  Follow up on dizziness.  Patient was seen 2 weeks ago, with intermittent dizziness.  Dizziness  This is a recurrent problem. The current episode started more than 1 month ago. The problem occurs intermittently. The problem has been waxing and waning. Pertinent negatives include no abdominal pain, congestion, fatigue or headaches. Exacerbated by: sitting to standing, at times. The treatment provided no relief.       Patient comes back today for follow-up after starting on cetirizine and Flonase.  Patient states her dizziness is almost completely resolved.  She states she had a slight episode today, she states that was the first episode she had had in many days.  She states the episode was very quick and resolved very quickly on its own.  She states she is able to stand quickly without any issues the movement of turning her head no longer causes her issues.      Objective   Vital  "Signs:   /78 (BP Location: Right arm, Patient Position: Sitting, Cuff Size: Adult)   Pulse 57   Temp 96.9 °F (36.1 °C) (Temporal)   Ht 177.8 cm (70\")   Wt 104 kg (229 lb 12.8 oz)   SpO2 96%   BMI 32.97 kg/m²     Physical Exam  Vitals reviewed.   Constitutional:       Appearance: Normal appearance. She is well-developed.   HENT:      Head: Normocephalic and atraumatic.      Right Ear: Tympanic membrane and external ear normal. There is no impacted cerumen.      Left Ear: Tympanic membrane and external ear normal. There is no impacted cerumen.   Eyes:      Conjunctiva/sclera: Conjunctivae normal.      Pupils: Pupils are equal, round, and reactive to light.   Cardiovascular:      Rate and Rhythm: Normal rate and regular rhythm.      Heart sounds: No murmur heard.    No friction rub.   Pulmonary:      Effort: Pulmonary effort is normal.      Breath sounds: Normal breath sounds. No wheezing or rhonchi.   Skin:     General: Skin is warm and dry.   Neurological:      Mental Status: She is alert and oriented to person, place, and time.   Psychiatric:         Mood and Affect: Mood and affect normal.         Behavior: Behavior normal.         Thought Content: Thought content normal.         Judgment: Judgment normal.        Result Review :   The following data was reviewed by: YELENA Cisneros on 06/17/2022:  Common labs    Common Labsle 5/13/22 5/13/22 5/13/22 5/13/22    1418 1418 1418 1418   Glucose  87     BUN  9     Creatinine  0.70     Sodium  137     Potassium  4.1     Chloride  101     Calcium  9.9     Albumin  4.80     Total Bilirubin  0.8     Alkaline Phosphatase  55     AST (SGOT)  17     ALT (SGPT)  15     WBC 6.66      Hemoglobin 13.9      Hematocrit 43.1      Platelets 235      Total Cholesterol   185    Triglycerides   150    HDL Cholesterol   46    LDL Cholesterol    112 (A)    Hemoglobin A1C    5.80 (A)   (A) Abnormal value            Data reviewed: Previous office note            Current " Outpatient Medications on File Prior to Visit   Medication Sig Dispense Refill   • amLODIPine (NORVASC) 10 MG tablet Take 1 tablet by mouth Daily. 90 tablet 1   • Cholecalciferol (Vitamin D) 50 MCG (2000 UT) capsule Take 1 capsule by mouth Daily. 90 capsule 0   • cloNIDine (CATAPRES) 0.1 MG tablet Take 1 tablet by mouth 2 (Two) Times a Day. 180 tablet 1   • Magnesium 250 MG tablet Take 1 tablet by mouth Daily.     • meclizine (ANTIVERT) 25 MG tablet Take 1 tablet by mouth 3 (Three) Times a Day As Needed for Dizziness. 30 tablet 0   • metoprolol succinate XL (TOPROL-XL) 50 MG 24 hr tablet Take 1 tablet by mouth once daily 90 tablet 0   • [DISCONTINUED] fluconazole (DIFLUCAN) 150 MG tablet Take 1 tablet for symptoms, can take second tablet 72 hours later if symptoms persist. 2 tablet 0   • [DISCONTINUED] cetirizine (zyrTEC) 10 MG tablet Take 1 tablet by mouth Daily for 30 days. 30 tablet 0     No current facility-administered medications on file prior to visit.        Assessment and Plan    Diagnoses and all orders for this visit:    1. Follow-up exam (Primary)    2. Dizziness  Comments:  Doing much better, encouraged to continue on the sutures seen.  Patient did verbalize understanding and stated she needed a refill.  We will refill medicine tod    3. Acute non-recurrent pansinusitis  -     cetirizine (zyrTEC) 10 MG tablet; Take 1 tablet by mouth Daily for 30 days.  Dispense: 90 tablet; Refill: 1        Follow Up   No follow-ups on file.  Patient was given instructions and counseling regarding her condition or for health maintenance advice. Please see specific information pulled into the AVS if appropriate.

## 2022-06-20 ENCOUNTER — TELEPHONE (OUTPATIENT)
Dept: FAMILY MEDICINE CLINIC | Facility: CLINIC | Age: 47
End: 2022-06-20

## 2022-06-20 NOTE — TELEPHONE ENCOUNTER
Attempted to contact pt regarding referrals. Jaxon placed order stating pt would complete at [place], wanting to clarify where she would like completed so we can schedule for her if needed. Left message for pt to return call.

## 2022-06-21 ENCOUNTER — TELEPHONE (OUTPATIENT)
Dept: FAMILY MEDICINE CLINIC | Facility: CLINIC | Age: 47
End: 2022-06-21

## 2022-06-21 NOTE — TELEPHONE ENCOUNTER
Spoke with pt, states she would like to have US breast and diagnostic mammogram completed at [facility] instead of [facility]. Changes made to referral so appt can be scheduled. Also gave pt number for scheduling so she can return missed call for US thyroid scheduling.

## 2022-07-13 ENCOUNTER — HOSPITAL ENCOUNTER (OUTPATIENT)
Dept: ULTRASOUND IMAGING | Facility: HOSPITAL | Age: 47
End: 2022-07-13

## 2022-07-13 ENCOUNTER — APPOINTMENT (OUTPATIENT)
Dept: MAMMOGRAPHY | Facility: HOSPITAL | Age: 47
End: 2022-07-13

## 2022-07-13 ENCOUNTER — APPOINTMENT (OUTPATIENT)
Dept: ULTRASOUND IMAGING | Facility: HOSPITAL | Age: 47
End: 2022-07-13

## 2022-07-28 RX ORDER — METOPROLOL SUCCINATE 50 MG/1
TABLET, EXTENDED RELEASE ORAL
Qty: 90 TABLET | Refills: 1 | Status: SHIPPED | OUTPATIENT
Start: 2022-07-28 | End: 2023-01-27 | Stop reason: SDUPTHER

## 2022-09-21 RX ORDER — ACETAMINOPHEN 160 MG
TABLET,DISINTEGRATING ORAL
Qty: 30 CAPSULE | Refills: 0 | Status: SHIPPED | OUTPATIENT
Start: 2022-09-21 | End: 2022-10-24

## 2022-10-24 RX ORDER — ACETAMINOPHEN 160 MG
TABLET,DISINTEGRATING ORAL
Qty: 30 CAPSULE | Refills: 0 | Status: SHIPPED | OUTPATIENT
Start: 2022-10-24 | End: 2022-11-21

## 2022-11-21 RX ORDER — ACETAMINOPHEN 160 MG
TABLET,DISINTEGRATING ORAL
Qty: 30 CAPSULE | Refills: 0 | Status: SHIPPED | OUTPATIENT
Start: 2022-11-21 | End: 2022-12-20

## 2022-12-20 DIAGNOSIS — J01.40 ACUTE NON-RECURRENT PANSINUSITIS: ICD-10-CM

## 2022-12-20 RX ORDER — ACETAMINOPHEN 160 MG
TABLET,DISINTEGRATING ORAL
Qty: 30 CAPSULE | Refills: 0 | Status: SHIPPED | OUTPATIENT
Start: 2022-12-20 | End: 2023-01-19

## 2022-12-20 RX ORDER — CETIRIZINE HYDROCHLORIDE 10 MG/1
TABLET ORAL
Qty: 30 TABLET | Refills: 0 | Status: SHIPPED | OUTPATIENT
Start: 2022-12-20 | End: 2023-01-19

## 2023-01-03 ENCOUNTER — TELEPHONE (OUTPATIENT)
Dept: FAMILY MEDICINE CLINIC | Facility: CLINIC | Age: 48
End: 2023-01-03
Payer: COMMERCIAL

## 2023-01-03 NOTE — TELEPHONE ENCOUNTER
Caller: Estefania Sowmya MICHAEL    Relationship: Self    Best call back number: [phone]    Requested Prescriptions:   Requested Prescriptions     Pending Prescriptions Disp Refills   • cloNIDine (CATAPRES) 0.1 MG tablet 180 tablet 1     Sig: Take 1 tablet by mouth 2 (Two) Times a Day.        Pharmacy where request should be sent: [address] [phone] [facility] [phone] FX     Does the patient have less than a 3 day supply:  [] Yes  [x] No    Would you like a call back once the refill request has been completed: [x] Yes [] No OR TEXT    If the office needs to give you a call back, can they leave a voicemail: [x] Yes [] No CAN LEAVE DETAILED    Vinny Rebolledo Rep   01/03/23 15:48 EST

## 2023-01-03 NOTE — TELEPHONE ENCOUNTER
Caller: Sowmya Mac    Relationship: Self    Best call back number: [phone]      What was the call regarding: PATIENT REPORTS SHE DOES HAVE AND APPOINTMENT ON 01/15 WITH ENT - [phone] - WITH HALEY DE LOS SANTOS    Do you require a callback: NO

## 2023-01-04 RX ORDER — CLONIDINE HYDROCHLORIDE 0.1 MG/1
0.1 TABLET ORAL 2 TIMES DAILY
Qty: 180 TABLET | Refills: 1 | Status: SHIPPED | OUTPATIENT
Start: 2023-01-04

## 2023-01-18 ENCOUNTER — TRANSCRIBE ORDERS (OUTPATIENT)
Dept: ADMINISTRATIVE | Facility: HOSPITAL | Age: 48
End: 2023-01-18
Payer: COMMERCIAL

## 2023-01-18 DIAGNOSIS — E07.9 THYROID MASS: Primary | ICD-10-CM

## 2023-01-19 DIAGNOSIS — J01.40 ACUTE NON-RECURRENT PANSINUSITIS: ICD-10-CM

## 2023-01-19 RX ORDER — ACETAMINOPHEN 160 MG
TABLET,DISINTEGRATING ORAL
Qty: 30 CAPSULE | Refills: 0 | Status: SHIPPED | OUTPATIENT
Start: 2023-01-19 | End: 2023-02-22

## 2023-01-19 RX ORDER — CETIRIZINE HYDROCHLORIDE 10 MG/1
TABLET ORAL
Qty: 30 TABLET | Refills: 0 | Status: SHIPPED | OUTPATIENT
Start: 2023-01-19 | End: 2023-01-27 | Stop reason: SDUPTHER

## 2023-01-27 DIAGNOSIS — J01.40 ACUTE NON-RECURRENT PANSINUSITIS: ICD-10-CM

## 2023-01-27 RX ORDER — AMLODIPINE BESYLATE 10 MG/1
10 TABLET ORAL DAILY
Qty: 90 TABLET | Refills: 1 | Status: SHIPPED | OUTPATIENT
Start: 2023-01-27

## 2023-01-27 RX ORDER — METOPROLOL SUCCINATE 50 MG/1
50 TABLET, EXTENDED RELEASE ORAL DAILY
Qty: 90 TABLET | Refills: 1 | Status: SHIPPED | OUTPATIENT
Start: 2023-01-27

## 2023-01-27 RX ORDER — CETIRIZINE HYDROCHLORIDE 10 MG/1
10 TABLET ORAL DAILY
Qty: 90 TABLET | Refills: 1 | Status: SHIPPED | OUTPATIENT
Start: 2023-01-27

## 2023-01-27 NOTE — TELEPHONE ENCOUNTER
Caller: Sowmya Mac    Relationship: Self    Best call back number: [phone]    Requested Prescriptions:   Requested Prescriptions     Pending Prescriptions Disp Refills   • metoprolol succinate XL (TOPROL-XL) 50 MG 24 hr tablet 90 tablet 1     Sig: Take 1 tablet by mouth Daily.   • cetirizine (zyrTEC) 10 MG tablet 30 tablet 0     Sig: Take 1 tablet by mouth Daily.   • amLODIPine (NORVASC) 10 MG tablet 90 tablet 1     Sig: Take 1 tablet by mouth Daily.        Pharmacy where request should be sent: [address] - [phone]  - [phone] FX     Additional details provided by patient: PATIENT IS WANTING TO KNOW CAN SHE GET HER PRESCRIPTION ALL AT THE SAME TIME.     Does the patient have less than a 3 day supply:  [x] Yes  [] No    Would you like a call back once the refill request has been completed: [x] Yes [] No    If the office needs to give you a call back, can they leave a voicemail: [x] Yes [] No    Vinny Dunham Rep   01/27/23 10:35 EST

## 2023-02-08 ENCOUNTER — TELEPHONE (OUTPATIENT)
Dept: FAMILY MEDICINE CLINIC | Facility: CLINIC | Age: 48
End: 2023-02-08
Payer: COMMERCIAL

## 2023-02-08 NOTE — TELEPHONE ENCOUNTER
Caller: Sowmya Mac    Relationship: Self    Best call back number: [phone]    What is the best time to reach you: ANY     Who are you requesting to speak with CLINICAL     What was the call regarding: PATIENT REQUESTING CHEST XRAY AND TO CHECK ANTIBODIES     PATIENT TESTED POSITIVE FOR COVID 19 ON Monday 2-6-23 AND URGENT CARE GAVE HER MEDICATION AND WANTED TO CALL BACK TO DISCUSS PAXLOVID     PLEASE ADVISE       Do you require a callback: YES

## 2023-02-09 ENCOUNTER — TELEPHONE (OUTPATIENT)
Dept: FAMILY MEDICINE CLINIC | Facility: CLINIC | Age: 48
End: 2023-02-09

## 2023-02-09 NOTE — TELEPHONE ENCOUNTER
Incoming transfer call    Spoke to patient to inquire of Rx - Paxlovid concern.    Patient voiced that on Thursday 02/02/2023, pt was experiencing dizziness, sweats, and was lethargic. On Monday 02/06/2023 pt was seen and treated at an Urgent Care in [place] located off [facility] and tested SARS positive, prescribed Paxlovid.    On Tuesday 02/07/2023, patient voiced that she went to local pharmacy [facility], re-tested SARS positive and informed by pharmacy tech that Rx- Paxlovid with patient's current BP Therapy may have her BP contraindications.    Patient BP therapy includes:  metoprolol succinate XL (TOPROL-XL) 50 MG 24 hr tablet (01/27/2023)  amLODIPine (NORVASC) 10 MG tablet (01/27/2023)  cloNIDine (CATAPRES) 0.1 MG tablet (01/04/2023)    Pt has no allergies/contraindication on file or that she is currently aware of. Pt voiced h/o Vertigo symptoms and does not take her Rx- meclizine (ANTIVERT) 25 MG tablet (05/18/2022)    Pt has not taken her Rx- Paxlovid, want to know if its safe to take due to her BP history?   Does she need a blood lab for SARS anitbodies check & XR for lungs check?    Please advise

## 2023-02-09 NOTE — TELEPHONE ENCOUNTER
Ms Mac said that she called yesterday and hasn't received a response back. She was given  Paxlovid for covid but the pharmacy told  her that this medication will lower her blood pressure. She is scared that with her blood pressure medication that she is already taking, the paxlovid will drop her blood pressure to low. She is asking for  A call back as soon as possible

## 2023-02-10 NOTE — TELEPHONE ENCOUNTER
Informed patient of PCP guidance. Patient voiced understanding and requested future scheduled appt with PCP on 03/02/2023 at 1500 be cancelled. No further questions/concerns.

## 2023-02-20 NOTE — TELEPHONE ENCOUNTER
Requested Prescriptions     Pending Prescriptions Disp Refills   • Cholecalciferol (Vitamin D3) 50 MCG (2000 UT) capsule [Pharmacy Med Name: Vitamin D3 2000 UNIT Oral Capsule] 30 capsule 0     Sig: Take 1 capsule by mouth once daily     Last OV: Office Visit with Tejinder Sellers APRN (06/17/2022)    No Future appt with PCP on file    Labs: Vitamin D 25 Hydroxy (05/13/2022 14:18)    Pharmacy: [address] [facility] - [phone]  - [phone]   [phone]

## 2023-02-22 RX ORDER — ACETAMINOPHEN 160 MG
TABLET,DISINTEGRATING ORAL
Qty: 30 CAPSULE | Refills: 0 | Status: SHIPPED | OUTPATIENT
Start: 2023-02-22 | End: 2023-03-20

## 2023-03-20 RX ORDER — ACETAMINOPHEN 160 MG
TABLET,DISINTEGRATING ORAL
Qty: 30 CAPSULE | Refills: 0 | Status: SHIPPED | OUTPATIENT
Start: 2023-03-20

## 2023-04-14 ENCOUNTER — TRANSCRIBE ORDERS (OUTPATIENT)
Dept: LAB | Facility: HOSPITAL | Age: 48
End: 2023-04-14
Payer: COMMERCIAL

## 2023-04-14 DIAGNOSIS — E04.1 THYROID NODULE: Primary | ICD-10-CM

## 2023-05-01 ENCOUNTER — HOSPITAL ENCOUNTER (OUTPATIENT)
Dept: ULTRASOUND IMAGING | Facility: HOSPITAL | Age: 48
Discharge: HOME | End: 2023-05-01
Admitting: PHYSICIAN ASSISTANT
Payer: COMMERCIAL

## 2023-05-01 DIAGNOSIS — E07.9 THYROID MASS: ICD-10-CM

## 2023-05-01 PROCEDURE — 76536 US EXAM OF HEAD AND NECK: CPT

## 2023-06-12 RX ORDER — ACETAMINOPHEN 160 MG
2000 TABLET,DISINTEGRATING ORAL DAILY
Qty: 30 CAPSULE | Refills: 0 | Status: SHIPPED | OUTPATIENT
Start: 2023-06-12

## 2023-07-26 RX ORDER — ACETAMINOPHEN 160 MG
2000 TABLET,DISINTEGRATING ORAL DAILY
Qty: 30 CAPSULE | Refills: 0 | OUTPATIENT
Start: 2023-07-26

## 2024-04-19 RX ORDER — ACETAMINOPHEN 160 MG
2000 TABLET,DISINTEGRATING ORAL DAILY
Qty: 30 CAPSULE | Refills: 0 | Status: SHIPPED | OUTPATIENT
Start: 2024-04-19

## 2025-08-07 ENCOUNTER — TRANSCRIBE ORDERS (OUTPATIENT)
Dept: ADMINISTRATIVE | Facility: HOSPITAL | Age: 50
End: 2025-08-07
Payer: COMMERCIAL

## 2025-08-07 DIAGNOSIS — E04.9 NON-TOXIC GOITER: Primary | ICD-10-CM

## 2025-08-07 DIAGNOSIS — Z12.31 SCREENING MAMMOGRAM FOR BREAST CANCER: Primary | ICD-10-CM

## 2025-08-14 ENCOUNTER — HOSPITAL ENCOUNTER (OUTPATIENT)
Dept: ULTRASOUND IMAGING | Facility: HOSPITAL | Age: 50
Discharge: HOME | End: 2025-08-14
Payer: COMMERCIAL

## 2025-08-14 ENCOUNTER — HOSPITAL ENCOUNTER (OUTPATIENT)
Dept: MAMMOGRAPHY | Facility: HOSPITAL | Age: 50
Discharge: HOME | End: 2025-08-14
Payer: COMMERCIAL

## 2025-08-14 DIAGNOSIS — E04.9 NON-TOXIC GOITER: ICD-10-CM

## 2025-08-14 DIAGNOSIS — Z12.31 SCREENING MAMMOGRAM FOR BREAST CANCER: ICD-10-CM

## 2025-08-14 PROCEDURE — 77063 BREAST TOMOSYNTHESIS BI: CPT

## 2025-08-14 PROCEDURE — 76536 US EXAM OF HEAD AND NECK: CPT

## 2025-08-14 PROCEDURE — 77067 SCR MAMMO BI INCL CAD: CPT

## 2025-08-15 ENCOUNTER — TRANSCRIBE ORDERS (OUTPATIENT)
Dept: ADMINISTRATIVE | Facility: HOSPITAL | Age: 50
End: 2025-08-15
Payer: COMMERCIAL

## 2025-08-15 DIAGNOSIS — R92.8 ABNORMAL MAMMOGRAM OF LEFT BREAST: Primary | ICD-10-CM
